# Patient Record
Sex: FEMALE | Race: BLACK OR AFRICAN AMERICAN | Employment: OTHER | URBAN - METROPOLITAN AREA
[De-identification: names, ages, dates, MRNs, and addresses within clinical notes are randomized per-mention and may not be internally consistent; named-entity substitution may affect disease eponyms.]

---

## 2017-01-06 DIAGNOSIS — E13.40 OTHER SPECIFIED DIABETES MELLITUS WITH DIABETIC NEUROPATHY: ICD-10-CM

## 2017-01-06 DIAGNOSIS — I10 ESSENTIAL HYPERTENSION: ICD-10-CM

## 2017-01-06 DIAGNOSIS — Z95.5 S/P PRIMARY ANGIOPLASTY WITH CORONARY STENT: ICD-10-CM

## 2017-01-10 RX ORDER — LISINOPRIL 5 MG/1
TABLET ORAL
Qty: 90 TABLET | Refills: 1 | Status: SHIPPED | OUTPATIENT
Start: 2017-01-10 | End: 2017-07-25 | Stop reason: SDUPTHER

## 2017-01-10 RX ORDER — FUROSEMIDE 40 MG/1
TABLET ORAL
Qty: 90 TABLET | Refills: 0 | Status: SHIPPED | OUTPATIENT
Start: 2017-01-10 | End: 2017-04-10 | Stop reason: SDUPTHER

## 2017-01-10 RX ORDER — METOPROLOL SUCCINATE 25 MG/1
TABLET, EXTENDED RELEASE ORAL
Qty: 90 TABLET | Refills: 1 | Status: SHIPPED | OUTPATIENT
Start: 2017-01-10 | End: 2017-07-25 | Stop reason: SDUPTHER

## 2017-01-10 RX ORDER — CLOPIDOGREL BISULFATE 75 MG/1
TABLET ORAL
Qty: 90 TABLET | Refills: 1 | Status: SHIPPED | OUTPATIENT
Start: 2017-01-10 | End: 2017-07-25 | Stop reason: SDUPTHER

## 2017-01-10 RX ORDER — GABAPENTIN 300 MG/1
CAPSULE ORAL
Qty: 270 CAPSULE | Refills: 0 | Status: SHIPPED | OUTPATIENT
Start: 2017-01-10 | End: 2017-04-10 | Stop reason: SDUPTHER

## 2017-04-04 ENCOUNTER — TELEPHONE (OUTPATIENT)
Dept: INTERNAL MEDICINE CLINIC | Age: 77
End: 2017-04-04

## 2017-04-06 ENCOUNTER — TELEPHONE (OUTPATIENT)
Dept: CARDIOLOGY CLINIC | Age: 77
End: 2017-04-06

## 2017-04-10 ENCOUNTER — OFFICE VISIT (OUTPATIENT)
Dept: INTERNAL MEDICINE CLINIC | Age: 77
End: 2017-04-10

## 2017-04-10 VITALS
OXYGEN SATURATION: 98 % | SYSTOLIC BLOOD PRESSURE: 130 MMHG | HEART RATE: 75 BPM | BODY MASS INDEX: 27.05 KG/M2 | DIASTOLIC BLOOD PRESSURE: 62 MMHG | HEIGHT: 62 IN | WEIGHT: 147 LBS

## 2017-04-10 DIAGNOSIS — Z95.5 S/P PRIMARY ANGIOPLASTY WITH CORONARY STENT: ICD-10-CM

## 2017-04-10 DIAGNOSIS — I10 ESSENTIAL HYPERTENSION: ICD-10-CM

## 2017-04-10 DIAGNOSIS — M25.552 HIP PAIN, BILATERAL: ICD-10-CM

## 2017-04-10 DIAGNOSIS — E78.00 PURE HYPERCHOLESTEROLEMIA: Primary | ICD-10-CM

## 2017-04-10 DIAGNOSIS — M25.551 HIP PAIN, BILATERAL: ICD-10-CM

## 2017-04-10 DIAGNOSIS — R60.0 PEDAL EDEMA: ICD-10-CM

## 2017-04-10 DIAGNOSIS — E11.40 CONTROLLED TYPE 2 DIABETES MELLITUS WITH DIABETIC NEUROPATHY, WITHOUT LONG-TERM CURRENT USE OF INSULIN (HCC): ICD-10-CM

## 2017-04-10 LAB — HBA1C MFR BLD: 6.1 %

## 2017-04-10 PROCEDURE — 99214 OFFICE O/P EST MOD 30 MIN: CPT | Performed by: INTERNAL MEDICINE

## 2017-04-10 PROCEDURE — 83036 HEMOGLOBIN GLYCOSYLATED A1C: CPT | Performed by: INTERNAL MEDICINE

## 2017-04-10 RX ORDER — GLIPIZIDE 10 MG/1
TABLET ORAL
Qty: 270 TABLET | Refills: 1 | Status: SHIPPED | OUTPATIENT
Start: 2017-04-10 | End: 2017-07-25 | Stop reason: SDUPTHER

## 2017-04-10 RX ORDER — TIZANIDINE 4 MG/1
2 TABLET ORAL 3 TIMES DAILY
Qty: 135 TABLET | Refills: 0 | Status: SHIPPED | OUTPATIENT
Start: 2017-04-10 | End: 2017-07-25 | Stop reason: SDUPTHER

## 2017-04-10 RX ORDER — FUROSEMIDE 40 MG/1
TABLET ORAL
Qty: 90 TABLET | Refills: 0 | Status: SHIPPED | OUTPATIENT
Start: 2017-04-10 | End: 2017-07-25 | Stop reason: SDUPTHER

## 2017-04-10 RX ORDER — HYDROCODONE BITARTRATE AND ACETAMINOPHEN 7.5; 325 MG/1; MG/1
TABLET ORAL
Qty: 180 TABLET | Refills: 0 | Status: SHIPPED | OUTPATIENT
Start: 2017-04-10 | End: 2017-07-25 | Stop reason: SDUPTHER

## 2017-04-10 RX ORDER — GABAPENTIN 300 MG/1
CAPSULE ORAL
Qty: 270 CAPSULE | Refills: 0 | Status: SHIPPED | OUTPATIENT
Start: 2017-04-10 | End: 2017-07-25 | Stop reason: SDUPTHER

## 2017-04-10 RX ORDER — ATORVASTATIN CALCIUM 40 MG/1
40 TABLET, FILM COATED ORAL DAILY
Qty: 30 TABLET | Refills: 5 | Status: SHIPPED | OUTPATIENT
Start: 2017-04-10 | End: 2017-04-21

## 2017-04-10 RX ORDER — SIMVASTATIN 20 MG
TABLET ORAL
Qty: 90 TABLET | Refills: 1 | Status: SHIPPED | OUTPATIENT
Start: 2017-04-10 | End: 2017-07-25 | Stop reason: SDUPTHER

## 2017-04-10 ASSESSMENT — ENCOUNTER SYMPTOMS
DIARRHEA: 1
BACK PAIN: 1
COUGH: 0
ABDOMINAL PAIN: 0
CHEST TIGHTNESS: 0
NAUSEA: 0
VOMITING: 0

## 2017-04-17 ENCOUNTER — NURSE ONLY (OUTPATIENT)
Dept: INTERNAL MEDICINE CLINIC | Age: 77
End: 2017-04-17

## 2017-04-17 DIAGNOSIS — I10 ESSENTIAL HYPERTENSION: ICD-10-CM

## 2017-04-17 DIAGNOSIS — E11.40 CONTROLLED TYPE 2 DIABETES MELLITUS WITH DIABETIC NEUROPATHY, WITHOUT LONG-TERM CURRENT USE OF INSULIN (HCC): ICD-10-CM

## 2017-04-17 DIAGNOSIS — E78.00 PURE HYPERCHOLESTEROLEMIA: ICD-10-CM

## 2017-04-17 DIAGNOSIS — M25.552 HIP PAIN, BILATERAL: ICD-10-CM

## 2017-04-17 DIAGNOSIS — M25.551 HIP PAIN, BILATERAL: ICD-10-CM

## 2017-04-17 LAB
A/G RATIO: 1.3 (ref 1.1–2.2)
ALBUMIN SERPL-MCNC: 3.8 G/DL (ref 3.4–5)
ALP BLD-CCNC: 93 U/L (ref 40–129)
ALT SERPL-CCNC: 12 U/L (ref 10–40)
ANION GAP SERPL CALCULATED.3IONS-SCNC: 13 MMOL/L (ref 3–16)
AST SERPL-CCNC: 15 U/L (ref 15–37)
BILIRUB SERPL-MCNC: 0.3 MG/DL (ref 0–1)
BUN BLDV-MCNC: 15 MG/DL (ref 7–20)
CALCIUM SERPL-MCNC: 10.4 MG/DL (ref 8.3–10.6)
CHLORIDE BLD-SCNC: 104 MMOL/L (ref 99–110)
CHOLESTEROL, TOTAL: 185 MG/DL (ref 0–199)
CO2: 24 MMOL/L (ref 21–32)
CREAT SERPL-MCNC: 0.8 MG/DL (ref 0.6–1.2)
GFR AFRICAN AMERICAN: >60
GFR NON-AFRICAN AMERICAN: >60
GLOBULIN: 3 G/DL
GLUCOSE BLD-MCNC: 148 MG/DL (ref 70–99)
HCT VFR BLD CALC: 30 % (ref 36–48)
HDLC SERPL-MCNC: 75 MG/DL (ref 40–60)
HEMOGLOBIN: 9.4 G/DL (ref 12–16)
LDL CHOLESTEROL CALCULATED: 86 MG/DL
MCH RBC QN AUTO: 27.8 PG (ref 26–34)
MCHC RBC AUTO-ENTMCNC: 31.2 G/DL (ref 31–36)
MCV RBC AUTO: 89.1 FL (ref 80–100)
PDW BLD-RTO: 14.4 % (ref 12.4–15.4)
PLATELET # BLD: 172 K/UL (ref 135–450)
PMV BLD AUTO: 9.2 FL (ref 5–10.5)
POTASSIUM SERPL-SCNC: 5.6 MMOL/L (ref 3.5–5.1)
RBC # BLD: 3.36 M/UL (ref 4–5.2)
SODIUM BLD-SCNC: 141 MMOL/L (ref 136–145)
TOTAL PROTEIN: 6.8 G/DL (ref 6.4–8.2)
TRIGL SERPL-MCNC: 122 MG/DL (ref 0–150)
VLDLC SERPL CALC-MCNC: 24 MG/DL
WBC # BLD: 4.5 K/UL (ref 4–11)

## 2017-04-17 PROCEDURE — 36415 COLL VENOUS BLD VENIPUNCTURE: CPT | Performed by: INTERNAL MEDICINE

## 2017-04-27 DIAGNOSIS — M54.16 LUMBAR RADICULOPATHY: ICD-10-CM

## 2017-04-27 DIAGNOSIS — E11.42 DIABETIC PERIPHERAL NEUROPATHY (HCC): ICD-10-CM

## 2017-04-27 DIAGNOSIS — G56.03 BILATERAL CARPAL TUNNEL SYNDROME: Primary | ICD-10-CM

## 2017-04-27 DIAGNOSIS — M25.551 HIP PAIN, BILATERAL: ICD-10-CM

## 2017-04-27 DIAGNOSIS — M25.552 HIP PAIN, BILATERAL: ICD-10-CM

## 2017-04-27 DIAGNOSIS — M17.0 OSTEOARTHRITIS OF BOTH KNEES, UNSPECIFIED OSTEOARTHRITIS TYPE: ICD-10-CM

## 2017-04-27 DIAGNOSIS — M11.20 CHONDROCALCINOSIS: ICD-10-CM

## 2017-04-28 ENCOUNTER — HOSPITAL ENCOUNTER (OUTPATIENT)
Dept: MAMMOGRAPHY | Age: 77
Discharge: HOME | End: 2017-04-28
Admitting: INTERNAL MEDICINE

## 2017-04-28 DIAGNOSIS — Z12.31 ENCOUNTER FOR SCREENING MAMMOGRAM FOR BREAST CANCER: ICD-10-CM

## 2017-05-17 ENCOUNTER — OFFICE VISIT (OUTPATIENT)
Dept: INTERNAL MEDICINE CLINIC | Age: 77
End: 2017-05-17

## 2017-05-17 VITALS
BODY MASS INDEX: 26.13 KG/M2 | HEIGHT: 62 IN | DIASTOLIC BLOOD PRESSURE: 62 MMHG | OXYGEN SATURATION: 98 % | HEART RATE: 72 BPM | SYSTOLIC BLOOD PRESSURE: 96 MMHG | WEIGHT: 142 LBS | RESPIRATION RATE: 16 BRPM

## 2017-05-17 DIAGNOSIS — E87.5 HYPERKALEMIA: ICD-10-CM

## 2017-05-17 DIAGNOSIS — L80 VITILIGO: ICD-10-CM

## 2017-05-17 DIAGNOSIS — E78.00 PURE HYPERCHOLESTEROLEMIA: Primary | ICD-10-CM

## 2017-05-17 DIAGNOSIS — D64.9 ANEMIA, UNSPECIFIED TYPE: ICD-10-CM

## 2017-05-17 PROCEDURE — 99213 OFFICE O/P EST LOW 20 MIN: CPT | Performed by: INTERNAL MEDICINE

## 2017-05-17 ASSESSMENT — ENCOUNTER SYMPTOMS
NAUSEA: 0
CHEST TIGHTNESS: 0
DIARRHEA: 1
VOMITING: 0
COUGH: 0

## 2017-05-17 ASSESSMENT — PATIENT HEALTH QUESTIONNAIRE - PHQ9
2. FEELING DOWN, DEPRESSED OR HOPELESS: 0
SUM OF ALL RESPONSES TO PHQ QUESTIONS 1-9: 0
1. LITTLE INTEREST OR PLEASURE IN DOING THINGS: 0
SUM OF ALL RESPONSES TO PHQ9 QUESTIONS 1 & 2: 0

## 2017-05-21 PROBLEM — E87.5 HYPERKALEMIA: Status: ACTIVE | Noted: 2017-05-21

## 2017-05-21 PROBLEM — D64.9 ANEMIA: Status: ACTIVE | Noted: 2017-05-21

## 2017-05-23 ENCOUNTER — TELEPHONE (OUTPATIENT)
Dept: INTERNAL MEDICINE CLINIC | Age: 77
End: 2017-05-23

## 2017-05-24 ENCOUNTER — HOSPITAL ENCOUNTER (OUTPATIENT)
Dept: OTHER | Age: 77
Discharge: HOME | End: 2017-05-31
Attending: INTERNAL MEDICINE | Admitting: INTERNAL MEDICINE

## 2017-06-29 ENCOUNTER — TELEPHONE (OUTPATIENT)
Dept: FAMILY MEDICINE CLINIC | Age: 77
End: 2017-06-29

## 2017-07-25 ENCOUNTER — OFFICE VISIT (OUTPATIENT)
Dept: FAMILY MEDICINE CLINIC | Age: 77
End: 2017-07-25

## 2017-07-25 VITALS
HEART RATE: 52 BPM | BODY MASS INDEX: 27.05 KG/M2 | WEIGHT: 147 LBS | SYSTOLIC BLOOD PRESSURE: 98 MMHG | HEIGHT: 62 IN | OXYGEN SATURATION: 98 % | RESPIRATION RATE: 16 BRPM | DIASTOLIC BLOOD PRESSURE: 62 MMHG

## 2017-07-25 DIAGNOSIS — K59.00 CONSTIPATION, UNSPECIFIED CONSTIPATION TYPE: ICD-10-CM

## 2017-07-25 DIAGNOSIS — M62.838 MUSCLE SPASM: ICD-10-CM

## 2017-07-25 DIAGNOSIS — D50.9 IRON DEFICIENCY ANEMIA, UNSPECIFIED IRON DEFICIENCY ANEMIA TYPE: ICD-10-CM

## 2017-07-25 DIAGNOSIS — E11.40 CONTROLLED TYPE 2 DIABETES MELLITUS WITH DIABETIC NEUROPATHY, WITHOUT LONG-TERM CURRENT USE OF INSULIN (HCC): Primary | ICD-10-CM

## 2017-07-25 DIAGNOSIS — Z95.5 S/P PRIMARY ANGIOPLASTY WITH CORONARY STENT: ICD-10-CM

## 2017-07-25 DIAGNOSIS — R60.0 PEDAL EDEMA: ICD-10-CM

## 2017-07-25 DIAGNOSIS — E78.00 PURE HYPERCHOLESTEROLEMIA: ICD-10-CM

## 2017-07-25 DIAGNOSIS — E87.5 HYPERKALEMIA: ICD-10-CM

## 2017-07-25 DIAGNOSIS — I10 ESSENTIAL HYPERTENSION: ICD-10-CM

## 2017-07-25 LAB — HBA1C MFR BLD: 6.7 %

## 2017-07-25 PROCEDURE — 83036 HEMOGLOBIN GLYCOSYLATED A1C: CPT | Performed by: INTERNAL MEDICINE

## 2017-07-25 PROCEDURE — 99214 OFFICE O/P EST MOD 30 MIN: CPT | Performed by: INTERNAL MEDICINE

## 2017-07-25 RX ORDER — FUROSEMIDE 40 MG/1
TABLET ORAL
Qty: 90 TABLET | Refills: 0 | Status: SHIPPED | OUTPATIENT
Start: 2017-07-25 | End: 2017-09-19 | Stop reason: SDUPTHER

## 2017-07-25 RX ORDER — GABAPENTIN 300 MG/1
CAPSULE ORAL
Qty: 270 CAPSULE | Refills: 0 | Status: SHIPPED | OUTPATIENT
Start: 2017-07-25 | End: 2017-09-19 | Stop reason: SDUPTHER

## 2017-07-25 RX ORDER — HYDROCODONE BITARTRATE AND ACETAMINOPHEN 7.5; 325 MG/1; MG/1
TABLET ORAL
Qty: 180 TABLET | Refills: 0 | Status: SHIPPED | OUTPATIENT
Start: 2017-07-25 | End: 2017-09-19 | Stop reason: SDUPTHER

## 2017-07-25 RX ORDER — GLIPIZIDE 10 MG/1
TABLET ORAL
Qty: 270 TABLET | Refills: 3 | Status: SHIPPED | OUTPATIENT
Start: 2017-07-25 | End: 2018-06-28 | Stop reason: SDUPTHER

## 2017-07-25 RX ORDER — TIZANIDINE 4 MG/1
2 TABLET ORAL 3 TIMES DAILY
Qty: 135 TABLET | Refills: 3 | Status: SHIPPED | OUTPATIENT
Start: 2017-07-25 | End: 2017-09-19 | Stop reason: SDUPTHER

## 2017-07-25 RX ORDER — CLOPIDOGREL BISULFATE 75 MG/1
75 TABLET ORAL DAILY
Qty: 90 TABLET | Refills: 1 | Status: SHIPPED | OUTPATIENT
Start: 2017-07-25 | End: 2018-01-31 | Stop reason: SDUPTHER

## 2017-07-25 RX ORDER — SIMVASTATIN 20 MG
TABLET ORAL
Qty: 90 TABLET | Refills: 1 | Status: SHIPPED | OUTPATIENT
Start: 2017-07-25 | End: 2018-05-24 | Stop reason: SDUPTHER

## 2017-07-25 RX ORDER — GLUCOSAMINE HCL/CHONDROITIN SU 500-400 MG
CAPSULE ORAL
Qty: 100 STRIP | Refills: 3 | Status: SHIPPED | OUTPATIENT
Start: 2017-07-25 | End: 2018-08-30 | Stop reason: SDUPTHER

## 2017-07-25 RX ORDER — LANCETS 30 GAUGE
EACH MISCELLANEOUS
Qty: 100 EACH | Refills: 3 | Status: SHIPPED | OUTPATIENT
Start: 2017-07-25 | End: 2018-08-30 | Stop reason: SDUPTHER

## 2017-07-25 RX ORDER — LISINOPRIL 5 MG/1
5 TABLET ORAL DAILY
Qty: 90 TABLET | Refills: 1 | Status: SHIPPED | OUTPATIENT
Start: 2017-07-25 | End: 2017-08-16 | Stop reason: ALTCHOICE

## 2017-07-25 RX ORDER — METOPROLOL SUCCINATE 25 MG/1
25 TABLET, EXTENDED RELEASE ORAL DAILY
Qty: 90 TABLET | Refills: 1 | Status: SHIPPED | OUTPATIENT
Start: 2017-07-25 | End: 2018-01-31 | Stop reason: SDUPTHER

## 2017-07-25 RX ORDER — POLYETHYLENE GLYCOL 3350 17 G/17G
17 POWDER, FOR SOLUTION ORAL DAILY
Qty: 3 BOTTLE | Refills: 3 | Status: SHIPPED | OUTPATIENT
Start: 2017-07-25 | End: 2018-06-28 | Stop reason: SDUPTHER

## 2017-07-25 RX ORDER — HYDROCODONE BITARTRATE AND ACETAMINOPHEN 7.5; 325 MG/1; MG/1
TABLET ORAL
Qty: 180 TABLET | Refills: 0 | Status: CANCELLED | OUTPATIENT
Start: 2017-07-25

## 2017-07-25 ASSESSMENT — ENCOUNTER SYMPTOMS
VOMITING: 0
NAUSEA: 0
CHEST TIGHTNESS: 0
DIARRHEA: 1
COUGH: 0

## 2017-07-28 ENCOUNTER — TELEPHONE (OUTPATIENT)
Dept: FAMILY MEDICINE CLINIC | Age: 77
End: 2017-07-28

## 2017-08-03 RX ORDER — BLOOD-GLUCOSE METER
EACH MISCELLANEOUS
Qty: 1 KIT | Refills: 1 | Status: SHIPPED | OUTPATIENT
Start: 2017-08-03

## 2017-08-07 ENCOUNTER — TELEPHONE (OUTPATIENT)
Dept: FAMILY MEDICINE CLINIC | Age: 77
End: 2017-08-07

## 2017-08-16 ENCOUNTER — OFFICE VISIT (OUTPATIENT)
Dept: CARDIOLOGY CLINIC | Age: 77
End: 2017-08-16

## 2017-08-16 VITALS
DIASTOLIC BLOOD PRESSURE: 64 MMHG | HEIGHT: 62 IN | BODY MASS INDEX: 27.23 KG/M2 | HEART RATE: 60 BPM | OXYGEN SATURATION: 98 % | SYSTOLIC BLOOD PRESSURE: 126 MMHG | WEIGHT: 148 LBS

## 2017-08-16 DIAGNOSIS — R00.2 PALPITATIONS: ICD-10-CM

## 2017-08-16 DIAGNOSIS — I25.10 CORONARY ARTERY DISEASE INVOLVING NATIVE CORONARY ARTERY OF NATIVE HEART WITHOUT ANGINA PECTORIS: Primary | ICD-10-CM

## 2017-08-16 DIAGNOSIS — E78.00 PURE HYPERCHOLESTEROLEMIA: ICD-10-CM

## 2017-08-16 DIAGNOSIS — I10 ESSENTIAL HYPERTENSION: ICD-10-CM

## 2017-08-16 PROCEDURE — 93000 ELECTROCARDIOGRAM COMPLETE: CPT | Performed by: INTERNAL MEDICINE

## 2017-08-16 PROCEDURE — 99214 OFFICE O/P EST MOD 30 MIN: CPT | Performed by: INTERNAL MEDICINE

## 2017-09-19 ENCOUNTER — OFFICE VISIT (OUTPATIENT)
Dept: FAMILY MEDICINE CLINIC | Age: 77
End: 2017-09-19

## 2017-09-19 VITALS
BODY MASS INDEX: 27.6 KG/M2 | OXYGEN SATURATION: 99 % | HEART RATE: 68 BPM | WEIGHT: 150 LBS | HEIGHT: 62 IN | SYSTOLIC BLOOD PRESSURE: 152 MMHG | DIASTOLIC BLOOD PRESSURE: 82 MMHG | RESPIRATION RATE: 16 BRPM

## 2017-09-19 DIAGNOSIS — E11.40 CONTROLLED TYPE 2 DIABETES MELLITUS WITH DIABETIC NEUROPATHY, WITHOUT LONG-TERM CURRENT USE OF INSULIN (HCC): Primary | ICD-10-CM

## 2017-09-19 DIAGNOSIS — M62.838 MUSCLE SPASM: ICD-10-CM

## 2017-09-19 DIAGNOSIS — D64.9 NORMOCHROMIC NORMOCYTIC ANEMIA: ICD-10-CM

## 2017-09-19 DIAGNOSIS — F51.02 ADJUSTMENT INSOMNIA: ICD-10-CM

## 2017-09-19 DIAGNOSIS — E78.2 MIXED HYPERLIPIDEMIA: ICD-10-CM

## 2017-09-19 DIAGNOSIS — R60.0 PEDAL EDEMA: ICD-10-CM

## 2017-09-19 DIAGNOSIS — Z95.5 S/P PRIMARY ANGIOPLASTY WITH CORONARY STENT: ICD-10-CM

## 2017-09-19 PROCEDURE — 99214 OFFICE O/P EST MOD 30 MIN: CPT | Performed by: INTERNAL MEDICINE

## 2017-09-19 RX ORDER — TIZANIDINE 4 MG/1
2 TABLET ORAL 3 TIMES DAILY
Qty: 135 TABLET | Refills: 3 | Status: SHIPPED | OUTPATIENT
Start: 2017-09-19 | End: 2018-06-28 | Stop reason: SDUPTHER

## 2017-09-19 RX ORDER — GABAPENTIN 300 MG/1
CAPSULE ORAL
Qty: 270 CAPSULE | Refills: 0 | Status: SHIPPED | OUTPATIENT
Start: 2017-09-19 | End: 2018-05-31 | Stop reason: SDUPTHER

## 2017-09-19 RX ORDER — HYDROCODONE BITARTRATE AND ACETAMINOPHEN 7.5; 325 MG/1; MG/1
TABLET ORAL
Qty: 180 TABLET | Refills: 0 | Status: SHIPPED | OUTPATIENT
Start: 2017-09-19 | End: 2018-05-22 | Stop reason: ALTCHOICE

## 2017-09-19 RX ORDER — FUROSEMIDE 40 MG/1
TABLET ORAL
Qty: 90 TABLET | Refills: 0 | Status: SHIPPED | OUTPATIENT
Start: 2017-09-19 | End: 2017-10-16 | Stop reason: SDUPTHER

## 2017-09-19 RX ORDER — AMITRIPTYLINE HYDROCHLORIDE 10 MG/1
10 TABLET, FILM COATED ORAL NIGHTLY
Qty: 90 TABLET | Refills: 0 | Status: SHIPPED | OUTPATIENT
Start: 2017-09-19 | End: 2017-10-26 | Stop reason: SDUPTHER

## 2017-09-19 ASSESSMENT — ENCOUNTER SYMPTOMS
NAUSEA: 0
VOMITING: 0
DIARRHEA: 1
CHEST TIGHTNESS: 0
COUGH: 0

## 2017-10-04 ENCOUNTER — TELEPHONE (OUTPATIENT)
Dept: FAMILY MEDICINE CLINIC | Age: 77
End: 2017-10-04

## 2017-10-13 DIAGNOSIS — E11.40 CONTROLLED TYPE 2 DIABETES MELLITUS WITH DIABETIC NEUROPATHY, WITHOUT LONG-TERM CURRENT USE OF INSULIN (HCC): ICD-10-CM

## 2017-10-13 DIAGNOSIS — E78.2 MIXED HYPERLIPIDEMIA: ICD-10-CM

## 2017-10-13 DIAGNOSIS — D64.9 NORMOCHROMIC NORMOCYTIC ANEMIA: ICD-10-CM

## 2017-10-13 LAB
A/G RATIO: 1.2 (ref 1.1–2.2)
ALBUMIN SERPL-MCNC: 3.6 G/DL (ref 3.4–5)
ALP BLD-CCNC: 91 U/L (ref 40–129)
ALT SERPL-CCNC: 15 U/L (ref 10–40)
ANION GAP SERPL CALCULATED.3IONS-SCNC: 9 MMOL/L (ref 3–16)
AST SERPL-CCNC: 15 U/L (ref 15–37)
BILIRUB SERPL-MCNC: 0.3 MG/DL (ref 0–1)
BUN BLDV-MCNC: 15 MG/DL (ref 7–20)
CALCIUM SERPL-MCNC: 9.4 MG/DL (ref 8.3–10.6)
CHLORIDE BLD-SCNC: 105 MMOL/L (ref 99–110)
CHOLESTEROL, TOTAL: 160 MG/DL (ref 0–199)
CO2: 27 MMOL/L (ref 21–32)
CREAT SERPL-MCNC: 0.8 MG/DL (ref 0.6–1.2)
GFR AFRICAN AMERICAN: >60
GFR NON-AFRICAN AMERICAN: >60
GLOBULIN: 3 G/DL
GLUCOSE BLD-MCNC: 175 MG/DL (ref 70–99)
HCT VFR BLD CALC: 30.8 % (ref 36–48)
HDLC SERPL-MCNC: 68 MG/DL (ref 40–60)
HEMOGLOBIN: 9.9 G/DL (ref 12–16)
LDL CHOLESTEROL CALCULATED: 68 MG/DL
MCH RBC QN AUTO: 29.5 PG (ref 26–34)
MCHC RBC AUTO-ENTMCNC: 32.2 G/DL (ref 31–36)
MCV RBC AUTO: 91.7 FL (ref 80–100)
PDW BLD-RTO: 13.5 % (ref 12.4–15.4)
PLATELET # BLD: 138 K/UL (ref 135–450)
PMV BLD AUTO: 9.9 FL (ref 5–10.5)
POTASSIUM SERPL-SCNC: 5.1 MMOL/L (ref 3.5–5.1)
RBC # BLD: 3.36 M/UL (ref 4–5.2)
SODIUM BLD-SCNC: 141 MMOL/L (ref 136–145)
TOTAL PROTEIN: 6.6 G/DL (ref 6.4–8.2)
TRIGL SERPL-MCNC: 118 MG/DL (ref 0–150)
VLDLC SERPL CALC-MCNC: 24 MG/DL
WBC # BLD: 4.2 K/UL (ref 4–11)

## 2017-10-26 DIAGNOSIS — F51.02 ADJUSTMENT INSOMNIA: ICD-10-CM

## 2017-10-26 RX ORDER — AMITRIPTYLINE HYDROCHLORIDE 10 MG/1
10 TABLET, FILM COATED ORAL NIGHTLY
Qty: 90 TABLET | Refills: 0 | Status: SHIPPED | OUTPATIENT
Start: 2017-10-26 | End: 2018-01-13 | Stop reason: SDUPTHER

## 2018-01-13 DIAGNOSIS — F51.02 ADJUSTMENT INSOMNIA: ICD-10-CM

## 2018-01-15 RX ORDER — AMITRIPTYLINE HYDROCHLORIDE 10 MG/1
TABLET, FILM COATED ORAL
Qty: 90 TABLET | Refills: 0 | Status: SHIPPED | OUTPATIENT
Start: 2018-01-15 | End: 2021-03-02 | Stop reason: ALTCHOICE

## 2018-01-30 ENCOUNTER — TELEPHONE (OUTPATIENT)
Dept: CARDIOLOGY CLINIC | Age: 78
End: 2018-01-30

## 2018-01-30 NOTE — TELEPHONE ENCOUNTER
Called patient. She is currently in [place]. I told her that she should not take any ibuprofen since she is already taking aspirin and plavix. Patient states that the physician who has been prescribing her pain medication will not fill them since she missed her appointment. I told her that she should be seen by someone in [place]. We do not prescribe pain medication. We can not diagnose her over the phone. Patient verbalized and confirmed understanding.

## 2018-01-30 NOTE — TELEPHONE ENCOUNTER
Patient states she fell and hurt her leg, pt would like to know if she can take 800 mg of ibuprofen. Patient is currently on plavix and aspirin. You can reach the pt at [phone].

## 2018-02-12 ENCOUNTER — TELEPHONE (OUTPATIENT)
Dept: CARDIOLOGY CLINIC | Age: 78
End: 2018-02-12

## 2018-02-12 NOTE — TELEPHONE ENCOUNTER
Called patient. She said that she will be staying in [place] until November then back in [place]. Requested that she sign a release form for medical records. Also called Cardiology in [facility],  [phone]. Patient did not sign a release form or have an appointment scheduled yet. Office will contact patient since they can not find anything in their system as having an appointment with Cardiology. Will send to O once we have a signed release form. Both patient and office verbalized and confirmed understanding.

## 2018-02-19 ENCOUNTER — TELEPHONE (OUTPATIENT)
Dept: FAMILY MEDICINE CLINIC | Age: 78
End: 2018-02-19

## 2018-02-19 NOTE — TELEPHONE ENCOUNTER
Patient want her [address] paperwork faxed again to [place]. She said their office did not get paperwork. There is no fax number on paperwork. She will call [facility] and get fax number and call us back.

## 2018-02-19 NOTE — TELEPHONE ENCOUNTER
She needs us to send the Parking [employer]acard letter that we made on 9/14/17 to [facility]. We are now just waiting on a fax number from the pt.

## 2018-03-28 DIAGNOSIS — E11.40 CONTROLLED TYPE 2 DIABETES MELLITUS WITH DIABETIC NEUROPATHY, WITHOUT LONG-TERM CURRENT USE OF INSULIN (HCC): ICD-10-CM

## 2018-05-22 ENCOUNTER — OFFICE VISIT (OUTPATIENT)
Dept: FAMILY MEDICINE CLINIC | Age: 78
End: 2018-05-22

## 2018-05-22 VITALS
RESPIRATION RATE: 16 BRPM | SYSTOLIC BLOOD PRESSURE: 130 MMHG | DIASTOLIC BLOOD PRESSURE: 72 MMHG | OXYGEN SATURATION: 96 % | TEMPERATURE: 97.5 F | WEIGHT: 155.4 LBS | HEART RATE: 59 BPM | HEIGHT: 62 IN | BODY MASS INDEX: 28.6 KG/M2

## 2018-05-22 DIAGNOSIS — I10 ESSENTIAL HYPERTENSION: ICD-10-CM

## 2018-05-22 DIAGNOSIS — R06.02 SOBOE (SHORTNESS OF BREATH ON EXERTION): ICD-10-CM

## 2018-05-22 DIAGNOSIS — E11.9 TYPE 2 DIABETES MELLITUS WITHOUT COMPLICATION, WITHOUT LONG-TERM CURRENT USE OF INSULIN (HCC): Primary | ICD-10-CM

## 2018-05-22 DIAGNOSIS — M54.16 LUMBAR RADICULOPATHY: ICD-10-CM

## 2018-05-22 DIAGNOSIS — E11.9 TYPE 2 DIABETES MELLITUS WITHOUT COMPLICATION, WITHOUT LONG-TERM CURRENT USE OF INSULIN (HCC): ICD-10-CM

## 2018-05-22 DIAGNOSIS — R60.0 LOWER LEG EDEMA: ICD-10-CM

## 2018-05-22 DIAGNOSIS — E13.40 OTHER SPECIFIED DIABETES MELLITUS WITH DIABETIC NEUROPATHY: ICD-10-CM

## 2018-05-22 LAB
A/G RATIO: 1.3 (ref 1.1–2.2)
ALBUMIN SERPL-MCNC: 3.9 G/DL (ref 3.4–5)
ALP BLD-CCNC: 66 U/L (ref 40–129)
ALT SERPL-CCNC: 14 U/L (ref 10–40)
ANION GAP SERPL CALCULATED.3IONS-SCNC: 12 MMOL/L (ref 3–16)
AST SERPL-CCNC: 15 U/L (ref 15–37)
BASOPHILS ABSOLUTE: 0 K/UL (ref 0–0.2)
BASOPHILS RELATIVE PERCENT: 0.5 %
BILIRUB SERPL-MCNC: 0.4 MG/DL (ref 0–1)
BUN BLDV-MCNC: 18 MG/DL (ref 7–20)
CALCIUM SERPL-MCNC: 9.6 MG/DL (ref 8.3–10.6)
CHLORIDE BLD-SCNC: 106 MMOL/L (ref 99–110)
CHOLESTEROL, TOTAL: 155 MG/DL (ref 0–199)
CO2: 22 MMOL/L (ref 21–32)
CREAT SERPL-MCNC: 0.8 MG/DL (ref 0.6–1.2)
CREATININE URINE POCT: NORMAL
EOSINOPHILS ABSOLUTE: 0.1 K/UL (ref 0–0.6)
EOSINOPHILS RELATIVE PERCENT: 3.6 %
FOLATE: >20 NG/ML (ref 4.78–24.2)
GFR AFRICAN AMERICAN: >60
GFR NON-AFRICAN AMERICAN: >60
GLOBULIN: 2.9 G/DL
GLUCOSE BLD-MCNC: 72 MG/DL (ref 70–99)
HBA1C MFR BLD: 7 %
HCT VFR BLD CALC: 31.4 % (ref 36–48)
HDLC SERPL-MCNC: 59 MG/DL (ref 40–60)
HEMOGLOBIN: 10 G/DL (ref 12–16)
LDL CHOLESTEROL CALCULATED: 71 MG/DL
LYMPHOCYTES ABSOLUTE: 1.1 K/UL (ref 1–5.1)
LYMPHOCYTES RELATIVE PERCENT: 28.4 %
MCH RBC QN AUTO: 29.5 PG (ref 26–34)
MCHC RBC AUTO-ENTMCNC: 32 G/DL (ref 31–36)
MCV RBC AUTO: 92.3 FL (ref 80–100)
MICROALBUMIN/CREAT 24H UR: NORMAL MG/G{CREAT}
MICROALBUMIN/CREAT UR-RTO: NORMAL
MONOCYTES ABSOLUTE: 0.4 K/UL (ref 0–1.3)
MONOCYTES RELATIVE PERCENT: 10.4 %
NEUTROPHILS ABSOLUTE: 2.2 K/UL (ref 1.7–7.7)
NEUTROPHILS RELATIVE PERCENT: 57.1 %
PDW BLD-RTO: 14.3 % (ref 12.4–15.4)
PLATELET # BLD: 159 K/UL (ref 135–450)
PMV BLD AUTO: 9.2 FL (ref 5–10.5)
POTASSIUM SERPL-SCNC: 5.4 MMOL/L (ref 3.5–5.1)
RBC # BLD: 3.4 M/UL (ref 4–5.2)
SODIUM BLD-SCNC: 140 MMOL/L (ref 136–145)
TOTAL PROTEIN: 6.8 G/DL (ref 6.4–8.2)
TRIGL SERPL-MCNC: 125 MG/DL (ref 0–150)
TSH SERPL DL<=0.05 MIU/L-ACNC: 0.79 UIU/ML (ref 0.27–4.2)
VITAMIN B-12: 198 PG/ML (ref 211–911)
VLDLC SERPL CALC-MCNC: 25 MG/DL
WBC # BLD: 3.9 K/UL (ref 4–11)

## 2018-05-22 PROCEDURE — 82044 UR ALBUMIN SEMIQUANTITATIVE: CPT | Performed by: NURSE PRACTITIONER

## 2018-05-22 PROCEDURE — G8417 CALC BMI ABV UP PARAM F/U: HCPCS | Performed by: NURSE PRACTITIONER

## 2018-05-22 PROCEDURE — 4040F PNEUMOC VAC/ADMIN/RCVD: CPT | Performed by: NURSE PRACTITIONER

## 2018-05-22 PROCEDURE — 1090F PRES/ABSN URINE INCON ASSESS: CPT | Performed by: NURSE PRACTITIONER

## 2018-05-22 PROCEDURE — G8427 DOCREV CUR MEDS BY ELIG CLIN: HCPCS | Performed by: NURSE PRACTITIONER

## 2018-05-22 PROCEDURE — G8598 ASA/ANTIPLAT THER USED: HCPCS | Performed by: NURSE PRACTITIONER

## 2018-05-22 PROCEDURE — 1036F TOBACCO NON-USER: CPT | Performed by: NURSE PRACTITIONER

## 2018-05-22 PROCEDURE — G8400 PT W/DXA NO RESULTS DOC: HCPCS | Performed by: NURSE PRACTITIONER

## 2018-05-22 PROCEDURE — 99214 OFFICE O/P EST MOD 30 MIN: CPT | Performed by: NURSE PRACTITIONER

## 2018-05-22 PROCEDURE — 1123F ACP DISCUSS/DSCN MKR DOCD: CPT | Performed by: NURSE PRACTITIONER

## 2018-05-22 PROCEDURE — 83036 HEMOGLOBIN GLYCOSYLATED A1C: CPT | Performed by: NURSE PRACTITIONER

## 2018-05-22 RX ORDER — BLOOD-GLUCOSE METER
KIT MISCELLANEOUS
Qty: 1 KIT | Refills: 0 | Status: SHIPPED | OUTPATIENT
Start: 2018-05-22 | End: 2021-03-09 | Stop reason: SDUPTHER

## 2018-05-22 ASSESSMENT — PATIENT HEALTH QUESTIONNAIRE - PHQ9
1. LITTLE INTEREST OR PLEASURE IN DOING THINGS: 0
SUM OF ALL RESPONSES TO PHQ QUESTIONS 1-9: 0
SUM OF ALL RESPONSES TO PHQ9 QUESTIONS 1 & 2: 0
2. FEELING DOWN, DEPRESSED OR HOPELESS: 0

## 2018-05-22 ASSESSMENT — ENCOUNTER SYMPTOMS
CHEST TIGHTNESS: 1
WHEEZING: 0
ALLERGIC/IMMUNOLOGIC NEGATIVE: 1
BACK PAIN: 1
SHORTNESS OF BREATH: 0
GASTROINTESTINAL NEGATIVE: 1
EYES NEGATIVE: 1

## 2018-05-24 DIAGNOSIS — R60.0 PEDAL EDEMA: ICD-10-CM

## 2018-05-24 DIAGNOSIS — Z95.5 S/P PRIMARY ANGIOPLASTY WITH CORONARY STENT: ICD-10-CM

## 2018-05-24 RX ORDER — LISINOPRIL 5 MG/1
TABLET ORAL
Qty: 30 TABLET | Refills: 0 | Status: SHIPPED | OUTPATIENT
Start: 2018-05-24 | End: 2018-05-31 | Stop reason: SDUPTHER

## 2018-05-24 RX ORDER — CLOPIDOGREL BISULFATE 75 MG/1
TABLET ORAL
Qty: 30 TABLET | Refills: 0 | Status: SHIPPED | OUTPATIENT
Start: 2018-05-24 | End: 2018-05-31 | Stop reason: SDUPTHER

## 2018-05-24 RX ORDER — FUROSEMIDE 40 MG/1
TABLET ORAL
Qty: 30 TABLET | Refills: 0 | Status: SHIPPED | OUTPATIENT
Start: 2018-05-24 | End: 2018-05-31 | Stop reason: SDUPTHER

## 2018-05-29 DIAGNOSIS — E11.40 CONTROLLED TYPE 2 DIABETES MELLITUS WITH DIABETIC NEUROPATHY, WITHOUT LONG-TERM CURRENT USE OF INSULIN (HCC): ICD-10-CM

## 2018-05-29 RX ORDER — GABAPENTIN 300 MG/1
CAPSULE ORAL
Qty: 270 CAPSULE | Refills: 0 | OUTPATIENT
Start: 2018-05-29

## 2018-05-31 ENCOUNTER — OFFICE VISIT (OUTPATIENT)
Dept: FAMILY MEDICINE CLINIC | Age: 78
End: 2018-05-31

## 2018-05-31 VITALS
SYSTOLIC BLOOD PRESSURE: 124 MMHG | OXYGEN SATURATION: 99 % | BODY MASS INDEX: 28.16 KG/M2 | HEIGHT: 62 IN | DIASTOLIC BLOOD PRESSURE: 74 MMHG | HEART RATE: 64 BPM | WEIGHT: 153 LBS

## 2018-05-31 DIAGNOSIS — E11.40 CONTROLLED TYPE 2 DIABETES MELLITUS WITH DIABETIC NEUROPATHY, WITHOUT LONG-TERM CURRENT USE OF INSULIN (HCC): ICD-10-CM

## 2018-05-31 DIAGNOSIS — I10 ESSENTIAL HYPERTENSION: ICD-10-CM

## 2018-05-31 PROCEDURE — 1090F PRES/ABSN URINE INCON ASSESS: CPT | Performed by: INTERNAL MEDICINE

## 2018-05-31 PROCEDURE — G8427 DOCREV CUR MEDS BY ELIG CLIN: HCPCS | Performed by: INTERNAL MEDICINE

## 2018-05-31 PROCEDURE — 99214 OFFICE O/P EST MOD 30 MIN: CPT | Performed by: INTERNAL MEDICINE

## 2018-05-31 PROCEDURE — G8417 CALC BMI ABV UP PARAM F/U: HCPCS | Performed by: INTERNAL MEDICINE

## 2018-05-31 PROCEDURE — G8400 PT W/DXA NO RESULTS DOC: HCPCS | Performed by: INTERNAL MEDICINE

## 2018-05-31 PROCEDURE — G8598 ASA/ANTIPLAT THER USED: HCPCS | Performed by: INTERNAL MEDICINE

## 2018-05-31 PROCEDURE — 1036F TOBACCO NON-USER: CPT | Performed by: INTERNAL MEDICINE

## 2018-05-31 PROCEDURE — 4040F PNEUMOC VAC/ADMIN/RCVD: CPT | Performed by: INTERNAL MEDICINE

## 2018-05-31 PROCEDURE — 1123F ACP DISCUSS/DSCN MKR DOCD: CPT | Performed by: INTERNAL MEDICINE

## 2018-05-31 RX ORDER — METOPROLOL SUCCINATE 25 MG/1
25 TABLET, EXTENDED RELEASE ORAL DAILY
Qty: 90 TABLET | Refills: 1 | Status: SHIPPED | OUTPATIENT
Start: 2018-05-31 | End: 2018-08-30 | Stop reason: SDUPTHER

## 2018-05-31 RX ORDER — HYDROCODONE BITARTRATE AND ACETAMINOPHEN 7.5; 325 MG/1; MG/1
TABLET ORAL
Qty: 180 TABLET | Refills: 0 | Status: SHIPPED | OUTPATIENT
Start: 2018-05-31 | End: 2018-05-31

## 2018-05-31 RX ORDER — GABAPENTIN 300 MG/1
CAPSULE ORAL
Qty: 270 CAPSULE | Refills: 0 | Status: SHIPPED | OUTPATIENT
Start: 2018-05-31 | End: 2018-08-30 | Stop reason: SDUPTHER

## 2018-05-31 ASSESSMENT — ENCOUNTER SYMPTOMS
NAUSEA: 0
COUGH: 0
DIARRHEA: 1
CHEST TIGHTNESS: 0
VOMITING: 0

## 2018-06-04 ENCOUNTER — TELEPHONE (OUTPATIENT)
Dept: FAMILY MEDICINE CLINIC | Age: 78
End: 2018-06-04

## 2018-06-28 DIAGNOSIS — E11.40 CONTROLLED TYPE 2 DIABETES MELLITUS WITH DIABETIC NEUROPATHY, WITHOUT LONG-TERM CURRENT USE OF INSULIN (HCC): ICD-10-CM

## 2018-06-28 DIAGNOSIS — M62.838 MUSCLE SPASM: ICD-10-CM

## 2018-06-28 DIAGNOSIS — K59.00 CONSTIPATION, UNSPECIFIED CONSTIPATION TYPE: ICD-10-CM

## 2018-06-29 RX ORDER — GLIPIZIDE 10 MG/1
TABLET ORAL
Qty: 270 TABLET | Refills: 3 | Status: SHIPPED | OUTPATIENT
Start: 2018-06-29 | End: 2018-08-30 | Stop reason: SDUPTHER

## 2018-06-29 RX ORDER — TIZANIDINE 4 MG/1
TABLET ORAL
Qty: 135 TABLET | Refills: 3 | Status: SHIPPED | OUTPATIENT
Start: 2018-06-29 | End: 2021-03-02 | Stop reason: ALTCHOICE

## 2018-06-29 RX ORDER — POLYETHYLENE GLYCOL 3350 17 G/17G
POWDER, FOR SOLUTION ORAL
Qty: 1581 G | Refills: 3 | Status: SHIPPED | OUTPATIENT
Start: 2018-06-29 | End: 2018-08-30

## 2018-07-11 ENCOUNTER — OFFICE VISIT (OUTPATIENT)
Dept: CARDIOLOGY CLINIC | Age: 78
End: 2018-07-11

## 2018-07-11 VITALS
BODY MASS INDEX: 28.51 KG/M2 | OXYGEN SATURATION: 99 % | HEART RATE: 58 BPM | WEIGHT: 151 LBS | HEIGHT: 61 IN | DIASTOLIC BLOOD PRESSURE: 66 MMHG | SYSTOLIC BLOOD PRESSURE: 142 MMHG

## 2018-07-11 DIAGNOSIS — I25.10 CORONARY ARTERY DISEASE INVOLVING NATIVE CORONARY ARTERY OF NATIVE HEART WITHOUT ANGINA PECTORIS: ICD-10-CM

## 2018-07-11 DIAGNOSIS — R07.9 CHEST PAIN, UNSPECIFIED TYPE: Primary | ICD-10-CM

## 2018-07-11 DIAGNOSIS — R06.02 SOBOE (SHORTNESS OF BREATH ON EXERTION): ICD-10-CM

## 2018-07-11 DIAGNOSIS — I10 ESSENTIAL HYPERTENSION: ICD-10-CM

## 2018-07-11 DIAGNOSIS — E78.5 HYPERLIPIDEMIA LDL GOAL <70: ICD-10-CM

## 2018-07-11 PROCEDURE — 1101F PT FALLS ASSESS-DOCD LE1/YR: CPT | Performed by: NURSE PRACTITIONER

## 2018-07-11 PROCEDURE — 1090F PRES/ABSN URINE INCON ASSESS: CPT | Performed by: NURSE PRACTITIONER

## 2018-07-11 PROCEDURE — G8400 PT W/DXA NO RESULTS DOC: HCPCS | Performed by: NURSE PRACTITIONER

## 2018-07-11 PROCEDURE — 4040F PNEUMOC VAC/ADMIN/RCVD: CPT | Performed by: NURSE PRACTITIONER

## 2018-07-11 PROCEDURE — G8598 ASA/ANTIPLAT THER USED: HCPCS | Performed by: NURSE PRACTITIONER

## 2018-07-11 PROCEDURE — 1036F TOBACCO NON-USER: CPT | Performed by: NURSE PRACTITIONER

## 2018-07-11 PROCEDURE — 93000 ELECTROCARDIOGRAM COMPLETE: CPT | Performed by: NURSE PRACTITIONER

## 2018-07-11 PROCEDURE — 1123F ACP DISCUSS/DSCN MKR DOCD: CPT | Performed by: NURSE PRACTITIONER

## 2018-07-11 PROCEDURE — G8417 CALC BMI ABV UP PARAM F/U: HCPCS | Performed by: NURSE PRACTITIONER

## 2018-07-11 PROCEDURE — G8427 DOCREV CUR MEDS BY ELIG CLIN: HCPCS | Performed by: NURSE PRACTITIONER

## 2018-07-11 PROCEDURE — 99214 OFFICE O/P EST MOD 30 MIN: CPT | Performed by: NURSE PRACTITIONER

## 2018-07-11 RX ORDER — ISOSORBIDE MONONITRATE 30 MG/1
30 TABLET, EXTENDED RELEASE ORAL DAILY
Qty: 30 TABLET | Refills: 3 | Status: SHIPPED | OUTPATIENT
Start: 2018-07-11 | End: 2018-08-30 | Stop reason: SDUPTHER

## 2018-07-11 NOTE — PATIENT INSTRUCTIONS
Continue same medications    Add Isosorbide Mononitrate 30 mg tablet daily    Scheduled stress test at [facility]

## 2018-07-11 NOTE — LETTER
[facility] [address] [place]. [address]  Phone: [phone]  Fax: [phone]    Adan Winston, APRN - CNP        July 11, 2018     Isael Christine MD  [address]    Patient: Justine Bautista  MR Number: [id number]  YOB: 1940  Date of Visit: 7/11/2018    Dear Dr. Isael Christine:    Thank you for the request for consultation for [facility]. HPI:  The patient is 77 y.o. female with a past medical history significant for HTN, hyperlipidemia, diabetes mellitus, CAD/stent to LAD, OA and breast CA usually followed by Dr. Julia Mclean who is here for routine follow up of CAD, HTN and HLP. She was last seen in 8/2017 and no changes at that time. Overall doing okay. Has noted some palpitations at night when she lies down. At times associated with wheezing. She does not have any wheezing during the day. Uses cane for ambulation. Has noted increasing SOB with exertion. Has infrequent episodes of chest pain (sharp) in center of her chest, occurred with walking/activity and resolves with rest. No NTG. Denies orthopnea/PND, cough, dizziness, syncope, edema , weight change or claudication. No regular exercise. Assessment:    1. Chest pain, unspecified type  -suggestive of angina  -will ask for stress test to evaluate for ischemia    2. Coronary artery disease involving native coronary artery of native heart without angina pectoris  -S/P VOLODYMYR x 2 to LAD in 2015  -now with recurrent chest pain and SOBOE (? Anginal equivalent)  -no ischemic changes on ECG today  -will add nitrate  -continue ASA, plavix, BB and statin    3. SOBOE (shortness of breath on exertion)  -features suggest anginal equivalent  -with some worsening    4. Essential hypertension  -fairly well controlled  -continue medical management    5.  Hyperlipidemia LDL goal <70  -on statin    Plan:    Lexiscan-Myoview to R/O ischemia given known CAD

## 2018-07-11 NOTE — PROGRESS NOTES
Roderick Moralez [address]  1940    July 11, 2018    CC:   Chief Complaint   Patient presents with    Hypertension     hld,dm,cad/ f/u/pt states that she feels okay but she has been wheezing more so at night/ no cardiac complaints at this time. Dimple Rodriguez / Sanjuana Koehler pt if she had her medication list with her she said yes but she left her purse out in the waiting room , she states all her medications are the same at this time. HPI:  The patient is 77 y.o. female with a past medical history significant for HTN, hyperlipidemia, diabetes mellitus, CAD/stent to LAD, OA and breast CA usually followed by Dr. Asmita Melendrez who is here for routine follow up of CAD, HTN and HLP. She was last seen in 8/2017 and no changes at that time. Overall doing okay. Has noted some palpitations at night when she lies down. At times associated with wheezing. She does not have any wheezing during the day. Uses cane for ambulation. Has noted increasing SOB with exertion. Has infrequent episodes of chest pain (sharp) in center of her chest, occurred with walking/activity and resolves with rest. No NTG. Denies orthopnea/PND, cough, dizziness, syncope, edema , weight change or claudication. No regular exercise. Review of Systems:  Constitutional: Denies weakness, night sweats or fever. + chronic fatigue  HEENT: Denies new visual changes, ringing in ears, nosebleeds, nasal congestion  Respiratory: + SOBOE and occasional cough  Cardiovascular: see HPI  GI: Denies N/V, diarrhea, constipation, abdominal pain, change in bowel habits, melena or hematochezia  : Denies urinary frequency, urgency, incontinence, hematuria or dysuria. Skin: Denies rash, hives, or cyanosis  Musculoskeletal: + chronic arthritic pain  Neurological: Denies syncope or TIA-like symptoms.   Psychiatric: Denies anxiety, insomnia or depression     Past Medical History:   Diagnosis Date    Ankle swelling     Arthritis     Breast cancer  furosemide (LASIX) 40 MG tablet TAKE 1 TABLET EVERY DAY 90 tablet 2    Blood Glucose Monitoring Suppl (BLOOD GLUCOSE SYSTEM NOEL) KIT Use as directed to test blood sugar three times a day  E11.9 1 kit 0    metFORMIN (GLUCOPHAGE) 1000 MG tablet TAKE 1 TABLET TWICE DAILY WITH MEALS  FOR  SUGAR 180 tablet 2    amitriptyline (ELAVIL) 10 MG tablet TAKE 1 TABLET EVERY NIGHT AT BEDTIME 90 tablet 0    Blood Glucose Monitoring Suppl (CONTOUR NEXT ONE) KIT Test blood sugar twice a day 1 kit 1    glucose blood VI test strips (ASCENSIA AUTODISC VI;ONE TOUCH ULTRA TEST VI) strip 1 each by In Vitro route 2 times daily As needed. 100 each 5    Glucose Blood (BLOOD GLUCOSE TEST STRIPS) STRP Use as directed to test blood sugar three times a day E11.9 100 strip 3    Lancets MISC Use as directed to test blood sugar three times a day E11.9 100 each 3    aspirin 81 MG tablet Take 1 tablet by mouth daily 30 tablet 3     No current facility-administered medications for this visit. Physical Exam:   BP (!) 142/66 (Site: Right Arm, Position: Sitting, Cuff Size: Small Adult)   Pulse 58   Ht 5' 1\" (1.549 m)   Wt 151 lb (68.5 kg)   SpO2 99%   BMI 28.53 kg/m²   Wt Readings from Last 2 Encounters:   07/11/18 151 lb (68.5 kg)   05/31/18 153 lb (69.4 kg)     Constitutional: She is oriented to person, place, and time. She appears well-developed and well-nourished. In no acute distress. HEENT: Normocephalic and atraumatic. Sclerae anicteric. No xanthelasmas. EOM's intact. Neck: Neck supple. No JVD present. Carotids without bruits. No thyromegaly present. No lymphadenopathy present. Cardiovascular: RRR, normal S1 and S2; no murmur/gallop or rub  Pulmonary/Chest: Effort normal.  Lungs clear to auscultation. Chest wall nontender  Abdominal: soft, nontender, nondistended. + bowel sounds; no hepatomegaly   Extremities: No edema or cyanosis. Pulses are 2+ radial/DP bilaterally. Cap refill brisk. Neurological: No focal deficit.

## 2018-07-20 ENCOUNTER — HOSPITAL ENCOUNTER (OUTPATIENT)
Dept: NUCLEAR MEDICINE | Age: 78
Discharge: HOME | End: 2018-07-20
Payer: MEDICARE

## 2018-07-20 ENCOUNTER — HOSPITAL ENCOUNTER (OUTPATIENT)
Dept: NON INVASIVE DIAGNOSTICS | Age: 78
Discharge: HOME | End: 2018-07-20
Payer: MEDICARE

## 2018-07-20 DIAGNOSIS — I25.810 CORONARY ARTERY DISEASE INVOLVING CORONARY BYPASS GRAFT OF NATIVE HEART WITHOUT ANGINA PECTORIS: ICD-10-CM

## 2018-07-20 DIAGNOSIS — I25.10 CORONARY ARTERY DISEASE INVOLVING NATIVE CORONARY ARTERY OF NATIVE HEART WITHOUT ANGINA PECTORIS: ICD-10-CM

## 2018-07-20 DIAGNOSIS — R07.9 CHEST PAIN, UNSPECIFIED TYPE: ICD-10-CM

## 2018-07-20 LAB
LV EF: 81 %
LVEF MODALITY: NORMAL

## 2018-07-20 PROCEDURE — A9502 TC99M TETROFOSMIN: HCPCS | Performed by: NURSE PRACTITIONER

## 2018-07-20 PROCEDURE — 93017 CV STRESS TEST TRACING ONLY: CPT

## 2018-07-20 PROCEDURE — 6360000002 HC RX W HCPCS: Performed by: NURSE PRACTITIONER

## 2018-07-20 PROCEDURE — 2580000003 HC RX 258: Performed by: NURSE PRACTITIONER

## 2018-07-20 PROCEDURE — 78452 HT MUSCLE IMAGE SPECT MULT: CPT

## 2018-07-20 PROCEDURE — 3430000000 HC RX DIAGNOSTIC RADIOPHARMACEUTICAL: Performed by: NURSE PRACTITIONER

## 2018-07-20 RX ORDER — 0.9 % SODIUM CHLORIDE 0.9 %
10 VIAL (ML) INJECTION PRN
Status: DISCONTINUED | OUTPATIENT
Start: 2018-07-20 | End: 2018-07-21 | Stop reason: HOSPADM

## 2018-07-20 RX ADMIN — TETROFOSMIN 10 MILLICURIE: 1.38 INJECTION, POWDER, LYOPHILIZED, FOR SOLUTION INTRAVENOUS at 12:28

## 2018-07-20 RX ADMIN — SODIUM CHLORIDE 10 ML: 9 INJECTION, SOLUTION INTRAMUSCULAR; INTRAVENOUS; SUBCUTANEOUS at 13:34

## 2018-07-20 RX ADMIN — REGADENOSON 0.4 MG: 0.08 INJECTION, SOLUTION INTRAVENOUS at 13:34

## 2018-07-20 RX ADMIN — SODIUM CHLORIDE 10 ML: 9 INJECTION, SOLUTION INTRAMUSCULAR; INTRAVENOUS; SUBCUTANEOUS at 12:28

## 2018-07-20 RX ADMIN — TETROFOSMIN 30 MILLICURIE: 1.38 INJECTION, POWDER, LYOPHILIZED, FOR SOLUTION INTRAVENOUS at 13:34

## 2018-08-07 NOTE — TELEPHONE ENCOUNTER
Last ov 05/31/2018   Future Appointments  Date Time Provider Deepak Whitney   8/8/2018 1:00 PM Oli Estrada [address] [id number] [facility]   8/30/2018 11:00 AM Ambrocio Keys MD [facility]   9/18/2018 2:45 PM Amado Rivera MD [facility]

## 2018-08-08 ENCOUNTER — HOSPITAL ENCOUNTER (OUTPATIENT)
Dept: MAMMOGRAPHY | Age: 78
Discharge: HOME | End: 2018-08-08
Payer: MEDICARE

## 2018-08-08 DIAGNOSIS — Z12.31 ENCOUNTER FOR SCREENING MAMMOGRAM FOR BREAST CANCER: ICD-10-CM

## 2018-08-08 PROCEDURE — 77067 SCR MAMMO BI INCL CAD: CPT

## 2018-08-09 RX ORDER — LISINOPRIL 5 MG/1
TABLET ORAL
Qty: 90 TABLET | Refills: 0 | Status: SHIPPED | OUTPATIENT
Start: 2018-08-09 | End: 2018-08-30 | Stop reason: SDUPTHER

## 2018-08-11 ENCOUNTER — TELEPHONE (OUTPATIENT)
Dept: FAMILY MEDICINE CLINIC | Age: 78
End: 2018-08-11

## 2018-08-12 ENCOUNTER — TELEPHONE (OUTPATIENT)
Dept: FAMILY MEDICINE CLINIC | Age: 78
End: 2018-08-12

## 2018-08-12 NOTE — TELEPHONE ENCOUNTER
Renny Roberts 18 to verify RX and see if there was an estimated date of delivery. Pharmacy is not open on the weekends. Attempted to call pt and her niece to check on pt's headache and BP with no answer on either line. Will call Janie in the AM when they open around 8.

## 2018-08-13 ENCOUNTER — TELEPHONE (OUTPATIENT)
Dept: FAMILY MEDICINE CLINIC | Age: 78
End: 2018-08-13

## 2018-08-30 ENCOUNTER — OFFICE VISIT (OUTPATIENT)
Dept: FAMILY MEDICINE CLINIC | Age: 78
End: 2018-08-30

## 2018-08-30 VITALS
OXYGEN SATURATION: 98 % | SYSTOLIC BLOOD PRESSURE: 124 MMHG | HEIGHT: 61 IN | BODY MASS INDEX: 28.89 KG/M2 | WEIGHT: 153 LBS | HEART RATE: 68 BPM | DIASTOLIC BLOOD PRESSURE: 70 MMHG

## 2018-08-30 DIAGNOSIS — R60.0 PEDAL EDEMA: ICD-10-CM

## 2018-08-30 DIAGNOSIS — E11.40 CONTROLLED TYPE 2 DIABETES MELLITUS WITH DIABETIC NEUROPATHY, WITHOUT LONG-TERM CURRENT USE OF INSULIN (HCC): Primary | ICD-10-CM

## 2018-08-30 DIAGNOSIS — I10 ESSENTIAL HYPERTENSION: ICD-10-CM

## 2018-08-30 DIAGNOSIS — Z95.5 S/P PRIMARY ANGIOPLASTY WITH CORONARY STENT: ICD-10-CM

## 2018-08-30 DIAGNOSIS — I25.10 CORONARY ARTERY DISEASE INVOLVING NATIVE CORONARY ARTERY OF NATIVE HEART WITHOUT ANGINA PECTORIS: ICD-10-CM

## 2018-08-30 DIAGNOSIS — Z91.81 AT HIGH RISK FOR FALLS: ICD-10-CM

## 2018-08-30 DIAGNOSIS — M62.838 MUSCLE SPASM: ICD-10-CM

## 2018-08-30 DIAGNOSIS — F51.02 ADJUSTMENT INSOMNIA: ICD-10-CM

## 2018-08-30 LAB — HBA1C MFR BLD: 7.2 %

## 2018-08-30 PROCEDURE — G8427 DOCREV CUR MEDS BY ELIG CLIN: HCPCS | Performed by: INTERNAL MEDICINE

## 2018-08-30 PROCEDURE — G8598 ASA/ANTIPLAT THER USED: HCPCS | Performed by: INTERNAL MEDICINE

## 2018-08-30 PROCEDURE — G8400 PT W/DXA NO RESULTS DOC: HCPCS | Performed by: INTERNAL MEDICINE

## 2018-08-30 PROCEDURE — 1036F TOBACCO NON-USER: CPT | Performed by: INTERNAL MEDICINE

## 2018-08-30 PROCEDURE — 1123F ACP DISCUSS/DSCN MKR DOCD: CPT | Performed by: INTERNAL MEDICINE

## 2018-08-30 PROCEDURE — 4040F PNEUMOC VAC/ADMIN/RCVD: CPT | Performed by: INTERNAL MEDICINE

## 2018-08-30 PROCEDURE — 99214 OFFICE O/P EST MOD 30 MIN: CPT | Performed by: INTERNAL MEDICINE

## 2018-08-30 PROCEDURE — G8417 CALC BMI ABV UP PARAM F/U: HCPCS | Performed by: INTERNAL MEDICINE

## 2018-08-30 PROCEDURE — 1090F PRES/ABSN URINE INCON ASSESS: CPT | Performed by: INTERNAL MEDICINE

## 2018-08-30 PROCEDURE — 1101F PT FALLS ASSESS-DOCD LE1/YR: CPT | Performed by: INTERNAL MEDICINE

## 2018-08-30 PROCEDURE — 83036 HEMOGLOBIN GLYCOSYLATED A1C: CPT | Performed by: INTERNAL MEDICINE

## 2018-08-30 RX ORDER — HYDROCODONE BITARTRATE AND ACETAMINOPHEN 10; 325 MG/1; MG/1
TABLET ORAL
Qty: 180 TABLET | Refills: 0 | Status: SHIPPED | OUTPATIENT
Start: 2018-08-30 | End: 2018-10-30

## 2018-08-30 RX ORDER — CLOPIDOGREL BISULFATE 75 MG/1
75 TABLET ORAL DAILY
Qty: 90 TABLET | Refills: 1 | Status: SHIPPED | OUTPATIENT
Start: 2018-08-30 | End: 2019-05-25 | Stop reason: SDUPTHER

## 2018-08-30 RX ORDER — LISINOPRIL 5 MG/1
5 TABLET ORAL DAILY
Qty: 90 TABLET | Refills: 1 | Status: SHIPPED | OUTPATIENT
Start: 2018-08-30 | End: 2021-03-02 | Stop reason: ALTCHOICE

## 2018-08-30 RX ORDER — TRAZODONE HYDROCHLORIDE 50 MG/1
TABLET ORAL
Qty: 90 TABLET | Refills: 1 | Status: SHIPPED | OUTPATIENT
Start: 2018-08-30 | End: 2021-03-23 | Stop reason: ALTCHOICE

## 2018-08-30 RX ORDER — TIZANIDINE HYDROCHLORIDE 2 MG/1
CAPSULE, GELATIN COATED ORAL
Qty: 270 CAPSULE | Refills: 1 | Status: SHIPPED | OUTPATIENT
Start: 2018-08-30 | End: 2021-03-02 | Stop reason: ALTCHOICE

## 2018-08-30 RX ORDER — LANCETS 30 GAUGE
EACH MISCELLANEOUS
Qty: 100 EACH | Refills: 5 | Status: SHIPPED | OUTPATIENT
Start: 2018-08-30 | End: 2021-03-09 | Stop reason: SDUPTHER

## 2018-08-30 RX ORDER — METOPROLOL SUCCINATE 25 MG/1
25 TABLET, EXTENDED RELEASE ORAL DAILY
Qty: 90 TABLET | Refills: 1 | Status: SHIPPED | OUTPATIENT
Start: 2018-08-30

## 2018-08-30 RX ORDER — GLUCOSAMINE HCL/CHONDROITIN SU 500-400 MG
CAPSULE ORAL
Qty: 100 STRIP | Refills: 3 | Status: SHIPPED | OUTPATIENT
Start: 2018-08-30

## 2018-08-30 RX ORDER — GABAPENTIN 300 MG/1
CAPSULE ORAL
Qty: 270 CAPSULE | Refills: 0 | Status: SHIPPED | OUTPATIENT
Start: 2018-08-30 | End: 2021-03-02

## 2018-08-30 RX ORDER — FUROSEMIDE 40 MG/1
40 TABLET ORAL DAILY
Qty: 90 TABLET | Refills: 2 | Status: SHIPPED | OUTPATIENT
Start: 2018-08-30

## 2018-08-30 RX ORDER — ISOSORBIDE MONONITRATE 30 MG/1
30 TABLET, EXTENDED RELEASE ORAL DAILY
Qty: 90 TABLET | Refills: 1 | Status: SHIPPED | OUTPATIENT
Start: 2018-08-30 | End: 2019-05-25 | Stop reason: SDUPTHER

## 2018-08-30 RX ORDER — GLIPIZIDE 10 MG/1
10 TABLET ORAL
Qty: 270 TABLET | Refills: 1 | Status: SHIPPED | OUTPATIENT
Start: 2018-08-30 | End: 2019-05-25 | Stop reason: SDUPTHER

## 2018-08-30 NOTE — PROGRESS NOTES
NEUROPATHY. Dispense: 270 capsule; Refill: 0  - HYDROcodone-acetaminophen (NORCO)  MG per tablet; Intended supply: 30 days. Dispense: 180 tablet; Refill: 0  - glipiZIDE (GLUCOTROL) 10 MG tablet; Take 1 tablet by mouth 3 times daily (with meals)  Dispense: 270 tablet; Refill: 1  - metFORMIN (GLUCOPHAGE) 1000 MG tablet; Take 1 tablet by mouth 2 times daily (with meals)  Dispense: 180 tablet; Refill: 1    3. At high risk for falls  -home safety tips given    4. Chest pain, unspecified type    - isosorbide mononitrate (IMDUR) 30 MG extended release tablet; Take 1 tablet by mouth daily  Dispense: 90 tablet; Refill: 1    5. Coronary artery disease involving native coronary artery of native heart without angina pectoris    - isosorbide mononitrate (IMDUR) 30 MG extended release tablet; Take 1 tablet by mouth daily  Dispense: 90 tablet; Refill: 1    6. Muscle spasm    - tiZANidine (ZANAFLEX) 2 MG capsule; 1 tab tid as needed for muscle spasm  Dispense: 270 capsule; Refill: 1    7. Essential hypertension    - lisinopril (PRINIVIL;ZESTRIL) 5 MG tablet; Take 1 tablet by mouth daily  Dispense: 90 tablet; Refill: 1  - metoprolol succinate (TOPROL XL) 25 MG extended release tablet; Take 1 tablet by mouth daily  Dispense: 90 tablet; Refill: 1    8. S/P primary angioplasty with coronary stent    - clopidogrel (PLAVIX) 75 MG tablet; Take 1 tablet by mouth daily  Dispense: 90 tablet; Refill: 1    9. Pedal edema    - furosemide (LASIX) 40 MG tablet; Take 1 tablet by mouth daily  Dispense: 90 tablet; Refill: 2    10. Adjustment insomnia    - traZODone (DESYREL) 50 MG tablet; 1 tab at bedtime  For sleep  Dispense: 90 tablet;  Refill: 1    Graciela received counseling on the following healthy behaviors: nutrition, exercise and medication adherence    Patient given educational materials on Diabetes    I have instructed Graciela to complete a self tracking handout on Blood Sugars  and instructed them to bring it with them to her next appointment. Discussed use, benefit, and side effects of prescribed medications. Barriers to medication compliance addressed. All patient questions answered. Pt voiced understanding. Controlled Substances Monitoring:     RX Monitoring 8/31/2018   Attestation The Prescription Monitoring Report for this patient was reviewed today. Documentation Possible medication side effects, risk of tolerance/dependence & alternative treatments discussed. Chronic Pain Dose reduction has been attempted. Medication Contracts -       An electronic signature was used to authenticate this note.     --Eugenia Moyer MD on 8/30/2018 at 11:53 AM

## 2018-08-30 NOTE — PATIENT INSTRUCTIONS
high or too low. The most common symptoms of high blood sugar include:  · Thirst.  · Frequent urination. · Weight loss. · Blurry vision. The symptoms of low blood sugar include:  · Sweating. · Shakiness. · Weakness. · Hunger. · Confusion. How can you prevent type 2 diabetes? The best way to prevent or delay type 2 diabetes is to adopt healthy habits, which include:  · Staying at a healthy weight. · Exercising regularly. · Eating healthy foods. How is type 2 diabetes treated? If you have type 2 diabetes, here are the most important things you can do. · Take your diabetes medicines. · Check your blood sugar as often as your doctor recommends. Also, get a hemoglobin A1c test at least every 6 months. · Try to eat a variety of foods and to spread carbohydrate throughout the day. Carbohydrate raises blood sugar higher and more quickly than any other nutrient does. Carbohydrate is found in sugar, breads and cereals, fruit, starchy vegetables such as potatoes and corn, and milk and yogurt. · Get at least 30 minutes of exercise on most days of the week. Walking is a good choice. You also may want to do other activities, such as running, swimming, cycling, or playing tennis or team sports. If your doctor says it's okay, do muscle-strengthening exercises at least 2 times a week. · See your doctor for checkups and tests on a regular schedule. · If you have high blood pressure or high cholesterol, take the medicines as prescribed by your doctor. · Do not smoke. Smoking can make health problems worse. This includes problems you might have with type 2 diabetes. If you need help quitting, talk to your doctor about stop-smoking programs and medicines. These can increase your chances of quitting for good. Follow-up care is a key part of your treatment and safety. Be sure to make and go to all appointments, and call your doctor if you are having problems.  It's also a good idea to know your test results and keep a list of the medicines you take. Where can you learn more? Go to https://chpepiceweb.[employer]Â®. org and sign in to your InstaGIS account. Enter X203 in the GetIntent box to learn more about \"Learning About Type 2 Diabetes. \"     If you do not have an account, please click on the \"Sign Up Now\" link. Current as of: December 7, 2017  Content Version: 11.7  © 9051-5304 "[employer] Incorporated. Care instructions adapted under license by [facility] ([facility]). If you have questions about a medical condition or this instruction, always ask your healthcare professional. [address] any warranty or liability for your use of this information.

## 2018-08-31 ASSESSMENT — ENCOUNTER SYMPTOMS
COUGH: 0
NAUSEA: 0
VOMITING: 0
DIARRHEA: 1
CHEST TIGHTNESS: 0

## 2018-09-18 ENCOUNTER — OFFICE VISIT (OUTPATIENT)
Dept: CARDIOLOGY CLINIC | Age: 78
End: 2018-09-18

## 2018-09-18 VITALS
OXYGEN SATURATION: 98 % | SYSTOLIC BLOOD PRESSURE: 138 MMHG | DIASTOLIC BLOOD PRESSURE: 68 MMHG | WEIGHT: 151 LBS | HEIGHT: 62 IN | HEART RATE: 62 BPM | BODY MASS INDEX: 27.79 KG/M2

## 2018-09-18 DIAGNOSIS — E78.00 PURE HYPERCHOLESTEROLEMIA: ICD-10-CM

## 2018-09-18 DIAGNOSIS — I25.10 CORONARY ARTERY DISEASE INVOLVING NATIVE CORONARY ARTERY OF NATIVE HEART WITHOUT ANGINA PECTORIS: ICD-10-CM

## 2018-09-18 DIAGNOSIS — R00.2 PALPITATIONS: Primary | ICD-10-CM

## 2018-09-18 DIAGNOSIS — I10 ESSENTIAL HYPERTENSION: ICD-10-CM

## 2018-09-18 PROCEDURE — G8400 PT W/DXA NO RESULTS DOC: HCPCS | Performed by: INTERNAL MEDICINE

## 2018-09-18 PROCEDURE — 1036F TOBACCO NON-USER: CPT | Performed by: INTERNAL MEDICINE

## 2018-09-18 PROCEDURE — 1090F PRES/ABSN URINE INCON ASSESS: CPT | Performed by: INTERNAL MEDICINE

## 2018-09-18 PROCEDURE — 99214 OFFICE O/P EST MOD 30 MIN: CPT | Performed by: INTERNAL MEDICINE

## 2018-09-18 PROCEDURE — G8598 ASA/ANTIPLAT THER USED: HCPCS | Performed by: INTERNAL MEDICINE

## 2018-09-18 PROCEDURE — 4040F PNEUMOC VAC/ADMIN/RCVD: CPT | Performed by: INTERNAL MEDICINE

## 2018-09-18 PROCEDURE — G8427 DOCREV CUR MEDS BY ELIG CLIN: HCPCS | Performed by: INTERNAL MEDICINE

## 2018-09-18 PROCEDURE — G8417 CALC BMI ABV UP PARAM F/U: HCPCS | Performed by: INTERNAL MEDICINE

## 2018-09-18 PROCEDURE — 1101F PT FALLS ASSESS-DOCD LE1/YR: CPT | Performed by: INTERNAL MEDICINE

## 2018-09-18 PROCEDURE — 1123F ACP DISCUSS/DSCN MKR DOCD: CPT | Performed by: INTERNAL MEDICINE

## 2018-09-18 NOTE — PROGRESS NOTES
Other (See Comments)    Rofecoxib Swelling    Vioxx     Sulfa Antibiotics Rash     Current Outpatient Prescriptions   Medication Sig Dispense Refill    Lancets MISC Use as directed to test blood sugar three times a day E11.9 100 each 5    blood glucose monitor strips Use as directed to test blood sugar three times a day E11.9 100 strip 3    gabapentin (NEURONTIN) 300 MG capsule TAKE 1 CAPSULE THREE TIMES DAILY  FOR  NEUROPATHY. 270 capsule 0    HYDROcodone-acetaminophen (NORCO)  MG per tablet Intended supply: 30 days.  180 tablet 0    lisinopril (PRINIVIL;ZESTRIL) 5 MG tablet Take 1 tablet by mouth daily 90 tablet 1    isosorbide mononitrate (IMDUR) 30 MG extended release tablet Take 1 tablet by mouth daily 90 tablet 1    glipiZIDE (GLUCOTROL) 10 MG tablet Take 1 tablet by mouth 3 times daily (with meals) 270 tablet 1    tiZANidine (ZANAFLEX) 2 MG capsule 1 tab tid as needed for muscle spasm 270 capsule 1    metoprolol succinate (TOPROL XL) 25 MG extended release tablet Take 1 tablet by mouth daily 90 tablet 1    clopidogrel (PLAVIX) 75 MG tablet Take 1 tablet by mouth daily 90 tablet 1    furosemide (LASIX) 40 MG tablet Take 1 tablet by mouth daily 90 tablet 2    metFORMIN (GLUCOPHAGE) 1000 MG tablet Take 1 tablet by mouth 2 times daily (with meals) 180 tablet 1    traZODone (DESYREL) 50 MG tablet 1 tab at bedtime  For sleep 90 tablet 1    tiZANidine (ZANAFLEX) 4 MG tablet TAKE 1/2 TABLET THREE TIMES DAILY 135 tablet 3    simvastatin (ZOCOR) 20 MG tablet TAKE 1 TABLET EVERY NIGHT FOR CHOLESTEROL 90 tablet 1    Blood Glucose Monitoring Suppl (BLOOD GLUCOSE SYSTEM NOEL) KIT Use as directed to test blood sugar three times a day  E11.9 1 kit 0    amitriptyline (ELAVIL) 10 MG tablet TAKE 1 TABLET EVERY NIGHT AT BEDTIME 90 tablet 0    Blood Glucose Monitoring Suppl (CONTOUR NEXT ONE) KIT Test blood sugar twice a day 1 kit 1    glucose blood VI test strips (ASCENSIA AUTODISC VI;ONE TOUCH ULTRA TEST VI) strip 1 each by In Vitro route 2 times daily As needed. 100 each 5    aspirin 81 MG tablet Take 1 tablet by mouth daily 30 tablet 3     No current facility-administered medications for this visit. Review of Systems:  Review of systems is as detailed above and all other systems are normal.      Physical Exam:   /68   Pulse 62   Ht 5' 1.75\" (1.568 m)   Wt 151 lb (68.5 kg) Comment: did not wish to remove shoes  SpO2 98%   BMI 27.84 kg/m²   Wt Readings from Last 3 Encounters:   09/18/18 151 lb (68.5 kg)   08/30/18 153 lb (69.4 kg)   07/11/18 151 lb (68.5 kg)     Constitutional: She is oriented to person, place, and time. She appears well-developed and well-nourished. In no acute distress. Head: Normocephalic and atraumatic. Pupils equal and round. Neck: Neck supple. No JVP or carotid bruit appreciated. No mass and no thyromegaly present. No lymphadenopathy present. Cardiovascular: Normal rate. Normal heart sounds. Exam reveals no gallop and no friction rub.   5-6/3 systolic murmur heard at the left sternal border. Pulmonary/Chest: Effort normal and breath sounds normal. No respiratory distress. She has no wheezes, rhonchi or rales. Abdominal: Soft, non-tender. Bowel sounds are normal. She exhibits no organomegaly, mass or bruit. Extremities: No edema, cyanosis, or clubbing. Pulses are 2+ radial/dorsalis pedis/posterior tibial/carotid bilaterally. Neurological: No gross cranial nerve deficit. Coordination normal.   Skin: Skin is warm and dry. There is no rash or diaphoresis. Psychiatric: She has a normal mood and affect.  Her speech is normal and behavior is normal.     Lab Review:    Lab Results   Component Value Date    TRIG 125 05/22/2018    HDL 59 05/22/2018    HDL 65 01/19/2012    LDLCALC 71 05/22/2018    LABVLDL 25 05/22/2018     Lab Results   Component Value Date    BUN 18 05/22/2018    CREATININE 0.8 05/22/2018     EKG Interpretation: 8/16/17, sinus rhythm     Image Review:     CTA pulm w contrast:9/17/2015  IMPRESSION: Negative for pulmonary embolus. Echo:9/18/2015  Summary  Normal left ventricular size and wall thickness. Normal left ventricular systolic function with an estimated ejection fraction of 55-60% . No regional wall motion abnormalities. There is reversal of E/A inflow velocities across the mitral valve suggesting impaired left ventricular relaxation. The mitral valve appears normal in structure and function. No evidence of mitral stenosis. Trivial mitral regurgitation,tricuspid & pulmonic regurgitation present. Stress:9/18/2015  Summary   Pharmacological Stress/MPI Results:      1. Technically a satisfactory study. 2. Normal pharmacological stress portion of the study. 3. Anterior Apical ischemia   4. Gated Study shows normal LV size and Systolic function; EF is 70 %. Stress 7/20/18  Normal myocardial perfusion scan       Ejection fraction 81 %        Cath:9/23/2015  CONCLUSIONS: Successful stenting of the 2 lesions in the mid LAD. The proximal mid lesion was reduced from 80% to 0% using a 2.25 x 18 mm Resolute stent. The mid-distal lesion was reduced from 70% to 0% using a 2.25 by a 12-mm drug-coated stent. Assessment/Plan:      Palpitations  Patient denies any further palpitations . Mary Morgan She is in regular rhythm today. I will continue to follow her clinically and consider 24 hour holter monitor if her symptoms persist or worsen. HTN (hypertension)  BP is stable today. BMP from 5/2018 stable. CAD (coronary artery disease)  Pt denies symptoms of angina today. Pt is on BBlocker, Statin and Antiplatelet therapy. I have recommended her starting on zontivity but since she is moving out of the city, I will hold off . Pure hypercholesterolemia  Last lipid panel from 5/2018 was early abnormal for elevated LDL at 71 and total cholesterol of 155. Mary Morgan Pt will have liver/ lipid profile rechecked before next visit.  LDL goal is less than

## 2018-09-18 NOTE — PATIENT INSTRUCTIONS
Patient Education        Palpitations: Care Instructions  Your Care Instructions    Heart palpitations are the uncomfortable sensation that your heart is beating fast or irregularly. You might feel pounding or fluttering in your chest. It might feel like your heart is skipping a beat. Although palpitations may be caused by a heart problem, they also occur because of stress, fatigue, or use of alcohol, caffeine, or nicotine. Many medicines, including diet pills, antihistamines, decongestants, and some herbal products, can cause heart palpitations. Nearly everyone has palpitations from time to time. Depending on your symptoms, your doctor may need to do more tests to try to find the cause of your palpitations. Follow-up care is a key part of your treatment and safety. Be sure to make and go to all appointments, and call your doctor if you are having problems. It's also a good idea to know your test results and keep a list of the medicines you take. How can you care for yourself at home? · Avoid caffeine, nicotine, and excess alcohol. · Do not take illegal drugs, such as methamphetamines and cocaine. · Do not take weight loss or diet medicines unless you talk with your doctor first.  · Get plenty of sleep. · Do not overeat. · If you have palpitations again, take deep breaths and try to relax. This may slow a racing heart. · If you start to feel lightheaded, lie down to avoid injuries that might result if you pass out and fall down. · Keep a record of your palpitations and bring it to your next doctor's appointment. Write down:  ¨ The date and time. ¨ Your pulse. (If your heart is beating fast, it may be hard to count your pulse.)  ¨ What you were doing when the palpitations started. ¨ How long the palpitations lasted. ¨ Any other symptoms. · If an activity causes palpitations, slow down or stop. Talk to your doctor before you do that activity again. · Take your medicines exactly as prescribed.  Call your doctor if you think you are having a problem with your medicine. When should you call for help? Call 911 anytime you think you may need emergency care. For example, call if:    · You passed out (lost consciousness).     · You have symptoms of a heart attack. These may include:  ¨ Chest pain or pressure, or a strange feeling in the chest.  ¨ Sweating. ¨ Shortness of breath. ¨ Pain, pressure, or a strange feeling in the back, neck, jaw, or upper belly or in one or both shoulders or arms. ¨ Lightheadedness or sudden weakness. ¨ A fast or irregular heartbeat. After you call 911, the  may tell you to chew 1 adult-strength or 2 to 4 low-dose aspirin. Wait for an ambulance. Do not try to drive yourself.     · You have symptoms of a stroke. These may include:  ¨ Sudden numbness, tingling, weakness, or loss of movement in your face, arm, or leg, especially on only one side of your body. ¨ Sudden vision changes. ¨ Sudden trouble speaking. ¨ Sudden confusion or trouble understanding simple statements. ¨ Sudden problems with walking or balance. ¨ A sudden, severe headache that is different from past headaches.    Call your doctor now or seek immediate medical care if:    · You have heart palpitations and:  ¨ Are dizzy or lightheaded, or you feel like you may faint. ¨ Have new or increased shortness of breath.    Watch closely for changes in your health, and be sure to contact your doctor if:    · You continue to have heart palpitations. Where can you learn more? Go to https://FreshdeskrolandoThingies.HireWheel. org and sign in to your 2nd Watch account. Enter R508 in the TransGaming box to learn more about \"Palpitations: Care Instructions. \"     If you do not have an account, please click on the \"Sign Up Now\" link. Current as of: December 6, 2017  Content Version: 11.7  © 7160-6634 [employer]. Care instructions adapted under license by [facility] ([facility]).  If you have questions about a

## 2019-05-28 DIAGNOSIS — E11.40 CONTROLLED TYPE 2 DIABETES MELLITUS WITH DIABETIC NEUROPATHY, WITHOUT LONG-TERM CURRENT USE OF INSULIN (HCC): ICD-10-CM

## 2019-05-29 RX ORDER — GLIPIZIDE 10 MG/1
TABLET ORAL
Qty: 30 TABLET | Refills: 0 | Status: SHIPPED | OUTPATIENT
Start: 2019-05-29 | End: 2021-03-02 | Stop reason: ALTCHOICE

## 2020-02-13 RX ORDER — ISOSORBIDE MONONITRATE 30 MG/1
TABLET, EXTENDED RELEASE ORAL
Qty: 90 TABLET | Refills: 0 | OUTPATIENT
Start: 2020-02-13

## 2020-02-13 RX ORDER — GLIPIZIDE 10 MG/1
TABLET ORAL
Qty: 270 TABLET | Refills: 0 | OUTPATIENT
Start: 2020-02-13

## 2020-02-13 RX ORDER — TIZANIDINE 2 MG/1
TABLET ORAL
Qty: 270 TABLET | Refills: 0 | OUTPATIENT
Start: 2020-02-13

## 2021-03-02 ENCOUNTER — HOSPITAL ENCOUNTER (OUTPATIENT)
Dept: WOUND CARE | Age: 81
Discharge: HOME | End: 2021-03-02
Payer: MEDICARE

## 2021-03-02 VITALS
WEIGHT: 151 LBS | DIASTOLIC BLOOD PRESSURE: 72 MMHG | HEIGHT: 62 IN | BODY MASS INDEX: 27.79 KG/M2 | TEMPERATURE: 98.9 F | SYSTOLIC BLOOD PRESSURE: 132 MMHG | RESPIRATION RATE: 16 BRPM | HEART RATE: 63 BPM

## 2021-03-02 PROCEDURE — 11045 DBRDMT SUBQ TISS EACH ADDL: CPT

## 2021-03-02 PROCEDURE — 11042 DBRDMT SUBQ TIS 1ST 20SQCM/<: CPT

## 2021-03-02 PROCEDURE — 99204 OFFICE O/P NEW MOD 45 MIN: CPT

## 2021-03-02 RX ORDER — FERROUS SULFATE 325(65) MG
325 TABLET ORAL
COMMUNITY

## 2021-03-02 RX ORDER — CLINDAMYCIN HYDROCHLORIDE 300 MG/1
300 CAPSULE ORAL 3 TIMES DAILY
COMMUNITY
Start: 2021-03-26 | End: 2021-03-23 | Stop reason: ALTCHOICE

## 2021-03-02 RX ORDER — PREGABALIN 100 MG/1
100 CAPSULE ORAL 2 TIMES DAILY
COMMUNITY
End: 2021-04-13 | Stop reason: SDUPTHER

## 2021-03-02 RX ORDER — OXYCODONE AND ACETAMINOPHEN 7.5; 325 MG/1; MG/1
1 TABLET ORAL EVERY 12 HOURS PRN
COMMUNITY
End: 2021-03-30 | Stop reason: ALTCHOICE

## 2021-03-02 RX ORDER — MAGNESIUM OXIDE 400 MG/1
400 TABLET ORAL DAILY
COMMUNITY

## 2021-03-02 RX ORDER — ROSUVASTATIN CALCIUM 40 MG/1
40 TABLET, COATED ORAL EVERY EVENING
COMMUNITY

## 2021-03-02 RX ORDER — GENTAMICIN SULFATE 1 MG/G
CREAM TOPICAL
Qty: 1 TUBE | Refills: 3 | Status: SHIPPED | OUTPATIENT
Start: 2021-03-02 | End: 2021-04-20 | Stop reason: ALTCHOICE

## 2021-03-02 RX ORDER — BACLOFEN 10 MG/1
10 TABLET ORAL 2 TIMES DAILY PRN
COMMUNITY

## 2021-03-02 RX ORDER — LIDOCAINE 40 MG/G
CREAM TOPICAL ONCE
Status: DISCONTINUED | OUTPATIENT
Start: 2021-03-02 | End: 2021-03-03 | Stop reason: HOSPADM

## 2021-03-02 ASSESSMENT — PAIN SCALES - GENERAL: PAINLEVEL_OUTOF10: 0

## 2021-03-02 NOTE — PROGRESS NOTES
[facility]  Progress Note and Procedure Note      Rosa Mora  AGE: 79 y.o. GENDER: female  : 1940  TODAY'S DATE:  3/2/2021    Subjective:     Chief Complaint   Patient presents with    Wound Check         HISTORY of PRESENT ILLNESS HPI     Rosa Mora is a 79 y.o. female who presents today for wound evaluation. History of Wound: Patient notes wound is been present for at least a month on the right and for 2 weeks on the left. She admits to being diabetic for greater than 40 years. She is not been checking her blood sugar because her glucometer does not have batteries. She admits to some discomfort in the wounds bilaterally. She does admit to numbness and tingling in her feet bilaterally. She was seen at the urgent care and given an antibiotic. She denies current nausea, vomiting, fever, chills, shortness of breath or chest pain.   Wound Pain:  intermittent  Severity:  2 / 10   Wound Type:  diabetic, pressure and Possible burn  Modifying Factors:  edema, venous stasis, lymphedema, diabetes, poor glucose control, chronic pressure, decreased mobility, shear force, obesity and arterial insufficiency  Associated Signs/Symptoms:  edema, drainage and pain        PAST MEDICAL HISTORY        Diagnosis Date    Ankle swelling     Arthritis     Breast cancer ([address] 6/15/1999    CAD (coronary artery disease) 10/13/2012    Cancer ([address]     breast    Generalized headaches     HTN (hypertension) 2015    Hyperlipidemia     OA (osteoarthritis) of knee 2015    Type II or unspecified type diabetes mellitus without mention of complication, not stated as uncontrolled        PAST SURGICAL HISTORY    Past Surgical History:   Procedure Laterality Date    BACK SURGERY  ,     BREAST SURGERY      breast biopys    CAR[place]EL [place]    HYSTERECTOMY      partial       FAMILY HISTORY    Family History   Problem Relation Age of Onset  Heart Disease Brother     Hearing Loss Mother     High Blood Pressure Mother        SOCIAL HISTORY    Social History     Tobacco Use    Smoking status: Never Smoker    Smokeless tobacco: Never Used    Tobacco comment: 3/2/21   Substance Use Topics    Alcohol use: No     Alcohol/week: 0.0 standard drinks    Drug use: No       ALLERGIES    Allergies   Allergen Reactions    Alendronate Sodium Anaphylaxis    Influenza Vaccines     Pneumococcal Vaccines Other (See Comments)    Rofecoxib Swelling    Vioxx     Sulfa Antibiotics Rash       MEDICATIONS    Current Outpatient Medications on File Prior to Encounter   Medication Sig Dispense Refill    magnesium oxide (MAG-OX) 400 MG tablet Take 400 mg by mouth daily      [START ON 3/26/2021] clindamycin (CLEOCIN) 300 MG capsule Take 300 mg by mouth 3 times daily      ferrous sulfate (IRON 325) 325 (65 Fe) MG tablet Take 325 mg by mouth daily (with breakfast)      rosuvastatin (CRESTOR) 40 MG tablet Take 40 mg by mouth every evening      oxyCODONE-acetaminophen (PERCOCET) 7.5-325 MG per tablet Take 1 tablet by mouth every 12 hours as needed for Pain.  pregabalin (LYRICA) 100 MG capsule Take 100 mg by mouth 2 times daily.       baclofen (LIORESAL) 10 MG tablet Take 10 mg by mouth 2 times daily as needed      clopidogrel (PLAVIX) 75 MG tablet TAKE 1 TABLET EVERY DAY 90 tablet 0    Lancets MISC Use as directed to test blood sugar three times a day E11.9 100 each 5    blood glucose monitor strips Use as directed to test blood sugar three times a day E11.9 100 strip 3    metoprolol succinate (TOPROL XL) 25 MG extended release tablet Take 1 tablet by mouth daily (Patient taking differently: Take 50 mg by mouth 2 times daily ) 90 tablet 1    furosemide (LASIX) 40 MG tablet Take 1 tablet by mouth daily 90 tablet 2    metFORMIN (GLUCOPHAGE) 1000 MG tablet Take 1 tablet by mouth 2 times daily (with meals) 180 tablet 1    traZODone (DESYREL) 50 MG tablet 1 tab at bedtime  For sleep 90 tablet 1    Blood Glucose Monitoring Suppl (BLOOD GLUCOSE SYSTEM NOEL) KIT Use as directed to test blood sugar three times a day  E11.9 1 kit 0    Blood Glucose Monitoring Suppl (CONTOUR NEXT ONE) KIT Test blood sugar twice a day 1 kit 1    glucose blood VI test strips (ASCENSIA AUTODISC VI;ONE TOUCH ULTRA TEST VI) strip 1 each by In Vitro route 2 times daily As needed. 100 each 5    aspirin 81 MG tablet Take 1 tablet by mouth daily 30 tablet 3     No current facility-administered medications on file prior to encounter. REVIEW OF SYSTEMS    Pertinent items are noted in HPI. Objective:      /72   Pulse 63   Temp 98.9 °F (37.2 °C) (Oral)   Resp 16   Ht 5' 1.5\" (1.562 m)   Wt 151 lb (68.5 kg)   BMI 28.07 kg/m²     PHYSICAL EXAM    Vascular: Vascular status Intact  palpable pedal pulses, right DP1/4 and PT1/4, left DP1/4 and PT1/4. Hair growthAbsent  both lower extremities and feet. Skin temperature is warm to cool from pretibial area to distal digits bilateral.  Exam is negative for rubor, pallor, cyanosis or signs of acute vascular compromise bilaterally. Exam is positive for edema bilateral lower extremity. Varicosities Present bilateral lower extremity. Neuro: Neurologic status diminished bilateral with epicritic diminished, proprioceptive Absent , vibratory sensationAbsent  and protopathic Present . DTRs Present bilateral Achilles. There were no reproducible neuritic symptoms on exam bilateral feet/ankles. Derm: Ulceration to bilateral medial heels. Ecchymosis Absent  bilateral feet/foot. Musculoskeletal: Mild pain with debridement of wounds 5/5 muscle strength in/eversion and dorsi/plantarflexion bilateral feet. No gross instability noted.           Assessment:     Patient Active Problem List   Diagnosis    Diabetes mellitus [facility]    Pure hypercholesterolemia    Bilateral carpal tunnel syndrome    Lumbar radiculopathy    Diabetic peripheral neuropathy [facility]    CAD (coronary artery disease)    Pain medication agreement signed    OA (osteoarthritis) of knee    Chondrocalcinosis    Other specified diabetes mellitus with diabetic neuropathy    Chest pain on breathing    Abnormal ECG    HTN (hypertension)    Chest pain    Abnormal stress test    Single vessel coronary artery disease    S/P primary angioplasty with coronary stent    Palpitations    Mixed hyperlipidemia    Hip pain, bilateral    Hyperkalemia    Anemia       Procedure Note    Performed by: Hollie Melton DPM    Consent obtained: Yes    Time out taken:  Yes    Pain Control: Anesthetic  Anesthetic: 4% Lidocaine Cream     Debridement:Excisional Debridement    Using curette, scissors and forceps the wound was sharply debrided    down through and including the removal of epidermis, dermis and subcutaneous tissue. Devitalized Tissue Debrided:  fibrin, biofilm, slough, necrotic/eschar, exudate and callus    Pre Debridement Measurements:  Are located in the Wound Documentation Flow Sheet    Wound #: 1 and 2     Post  Debridement Measurements:  Wound 03/02/21 #1, Left Medial Heel, Diabetic Foot Ulcer, Carbajal 1, onset 2/16/21 (Active)   Wound Image   03/02/21 0840   Wound Etiology Diabetic Carbajal 1 03/02/21 0840   Wound Cleansed Irrigated with saline; Soap and water 03/02/21 0840   Wound Length (cm) 4.2 cm 03/02/21 0840   Wound Width (cm) 3.8 cm 03/02/21 0840   Wound Depth (cm) 0.2 cm 03/02/21 0840   Wound Surface Area (cm^2) 15.96 cm^2 03/02/21 0840   Wound Volume (cm^3) 3.19 cm^3 03/02/21 0840   Post-Procedure Length (cm) 4.5 cm 03/02/21 0930   Post-Procedure Width (cm) 5 cm 03/02/21 0930   Post-Procedure Depth (cm) 0.1 cm 03/02/21 0930   Post-Procedure Surface Area (cm^2) 22.5 cm^2 03/02/21 0930   Post-Procedure Volume (cm^3) 2.25 cm^3 03/02/21 0930   Wound Assessment Pink/red; Other (Comment) 03/02/21 0840   Drainage Amount Moderate 03/02/21 0840   Drainage Description Serosanguinous 03/02/21 0840   Odor None 03/02/21 0840   Zahida-wound Assessment Fragile 03/02/21 0840   Number of days: 0       Wound 03/02/21 #2, Right Medial Heel, Diabetic Foot Ulcer, Carbajal 1, onset 11/1/20 (Active)   Wound Image   03/02/21 0840   Wound Etiology Diabetic Carbajal 1 03/02/21 0840   Wound Cleansed Irrigated with saline; Soap and water 03/02/21 0840   Wound Length (cm) 1.6 cm 03/02/21 0840   Wound Width (cm) 1.5 cm 03/02/21 0840   Wound Depth (cm) 0.2 cm 03/02/21 0840   Wound Surface Area (cm^2) 2.4 cm^2 03/02/21 0840   Wound Volume (cm^3) 0.48 cm^3 03/02/21 0840   Post-Procedure Length (cm) 3 cm 03/02/21 0930   Post-Procedure Width (cm) 5 cm 03/02/21 0930   Post-Procedure Depth (cm) 0.2 cm 03/02/21 0930   Post-Procedure Surface Area (cm^2) 15 cm^2 03/02/21 0930   Post-Procedure Volume (cm^3) 3 cm^3 03/02/21 0930   Wound Assessment Pink/red 03/02/21 0840   Drainage Amount Moderate 03/02/21 0840   Drainage Description Serosanguinous 03/02/21 0840   Odor None 03/02/21 0840   Zahida-wound Assessment Fragile 03/02/21 0840   Margins Attached edges 03/02/21 0840   Number of days: 0           Total Surface Area Debrided:  37.5 sq cm to the subcutaneous tissue bilaterally    Percentage of wound debrided 100%    Bleeding:  Minimal    Hemostasis Achieved:  by pressure    Procedural Pain:  0  / 10     Post Procedural Pain:  0 / 10     Response to treatment:  Well tolerated by patient. Plan:   Patient examined and evaluated  Wounds debridement without incident  All questions answered to patient satisfaction  Patient encouraged to find out what caused the wound either pressure or a heat source possibly chemical burn  Most likely this is due to pressure or a burn  Daily dressing changes with gentamicin cream  Keep foot offloaded  Follow-up in 1 week  The nature of the patient's condition was explained in depth.  The patient was informed that their compliance to the treatment plan is paramount to successful healing and prevention of further ulceration and/or infection     Discharge Treatment  Daily dressing changes with compression keep legs elevated at all times and not active    Written Patient Discharge Instructions Given            Electronically signed by Peterson Felix DPM on 3/2/2021 at 9:59 AM

## 2021-03-02 NOTE — PLAN OF CARE
[address]:      [facility] [address] [place], [address] [place] [address]  p: [phone] f: [phone]    Ordering Center:     [address]  [address] [address]  [phone]  Dept: [phone]   Fax# [phone]    Patient Information:      Shaista Lopez  [address]   [phone]   : 1940  AGE: 79 y.o. GENDER: female   TODAYS DATE:  3/2/2021    Insurance:      PRIMARY INSURANCE:  Plan: [employer] PLUS HMO  Coverage: HU[employer] MEDICARE  Effective Date: 2016  Group Number: [unfilled]  Subscriber Number: [id number] - (Medicare Managed)    Payor/Plan Subscr  Sex Relation Sub. Ins. ID Effective Group Num   1.  [address] 1940 Female Self [id number]                                    [id number]         Patient Wound Information:     Additional ICD-10 Codes:E11.621,L97.412    Patient Active Problem List   Diagnosis Code    Diabetes mellitus ([facility] E11.9    Pure hypercholesterolemia E78.00    Bilateral carpal tunnel syndrome G56.03    Lumbar radiculopathy M54.16    Diabetic peripheral neuropathy (HCC) E11.42    CAD (coronary artery disease) I25.10    Pain medication agreement signed Z02.89    OA (osteoarthritis) of knee M17.10    Chondrocalcinosis M11.20    Other specified diabetes mellitus with diabetic neuropathy E13.40    Chest pain on breathing R07.1    Abnormal ECG R94.31    HTN (hypertension) I10    Chest pain R07.9    Abnormal stress test R94.39    Single vessel coronary artery disease I25.10    S/P primary angioplasty with coronary stent Z95.5    Palpitations R00.2    Mixed hyperlipidemia E78.2    Hip pain, bilateral M25.551, M25.552    Hyperkalemia E87.5    Anemia D64.9       WOUNDS REQUIRING DRESSING SUPPLIES:     Wound 21 #1, Left Medial Heel, Diabetic Foot Ulcer, Carbajal 1, onset 21 (Active) Wound Image   03/02/21 0958   Wound Etiology Diabetic Carbajal 1 03/02/21 0840   Dressing Status New dressing applied 03/02/21 1010   Wound Cleansed Irrigated with saline; Soap and water 03/02/21 0840   Dressing/Treatment Other (comment) 03/02/21 1010   Wound Length (cm) 4.2 cm 03/02/21 0840   Wound Width (cm) 3.8 cm 03/02/21 0840   Wound Depth (cm) 0.2 cm 03/02/21 0840   Wound Surface Area (cm^2) 15.96 cm^2 03/02/21 0840   Wound Volume (cm^3) 3.19 cm^3 03/02/21 0840   Post-Procedure Length (cm) 4.5 cm 03/02/21 0930   Post-Procedure Width (cm) 5 cm 03/02/21 0930   Post-Procedure Depth (cm) 0.1 cm 03/02/21 0930   Post-Procedure Surface Area (cm^2) 22.5 cm^2 03/02/21 0930   Post-Procedure Volume (cm^3) 2.25 cm^3 03/02/21 0930   Wound Assessment Pink/red; Other (Comment) 03/02/21 0840   Drainage Amount Moderate 03/02/21 0840   Drainage Description Serosanguinous 03/02/21 0840   Odor None 03/02/21 0840   Zahida-wound Assessment Fragile 03/02/21 0840   Number of days: 0       Wound 03/02/21 #2, Right Medial Heel, Diabetic Foot Ulcer, Carbajal 1, onset 11/1/20 (Active)   Wound Image   03/02/21 0958   Wound Etiology Diabetic Carbajal 1 03/02/21 0840   Dressing Status New dressing applied 03/02/21 1010   Wound Cleansed Irrigated with saline; Soap and water 03/02/21 0840   Dressing/Treatment Other (comment) 03/02/21 1010   Wound Length (cm) 1.6 cm 03/02/21 0840   Wound Width (cm) 1.5 cm 03/02/21 0840   Wound Depth (cm) 0.2 cm 03/02/21 0840   Wound Surface Area (cm^2) 2.4 cm^2 03/02/21 0840   Wound Volume (cm^3) 0.48 cm^3 03/02/21 0840   Post-Procedure Length (cm) 3 cm 03/02/21 0930   Post-Procedure Width (cm) 5 cm 03/02/21 0930   Post-Procedure Depth (cm) 0.2 cm 03/02/21 0930   Post-Procedure Surface Area (cm^2) 15 cm^2 03/02/21 0930   Post-Procedure Volume (cm^3) 3 cm^3 03/02/21 0930   Wound Assessment Pink/red 03/02/21 0840   Drainage Amount Moderate 03/02/21 0840   Drainage Description Serosanguinous 03/02/21 0840 Odor None 03/02/21 0840   Zahida-wound Assessment Fragile 03/02/21 0840   Margins Attached edges 03/02/21 0840   Number of days: 0          Supplies Requested :      WOUND #: 1   PRIMARY DRESSING:    Other: Gentamycin cream   Cover and Secure with: 4X4 gauze pad  Bulky roll gauze  Other Surepress     FREQUENCY OF DRESSING CHANGES:  Daily    Wound Thickness [x] Full   []Partial       WOUND #: 2   PRIMARY DRESSING:    Pharmaceutical medication Gentamycin cream   Cover and Secure with: 4X4 gauze pad  ABD pad  Bulky roll gauze     FREQUENCY OF DRESSING CHANGES:  Daily    Wound Thickness [x] Full   []Partial                     Patient Wound(s) Debrided: [x] Yes   [] No    Debridement Date: 3/2/2021    Debribement Type: Excisional/Sharp    ADDITIONAL ITEMS:  [] Gloves Small  [x] Gloves Medium [] Gloves Large [] Gloves [place]  [] Paper Tape 1\" [] Paper Tape 2\" [] Paper Tape 3\"  [x] Medipore Tape 3\"  [x] Saline  [] Skin Prep   [] Adhesive Remover   [] Cotton Tip Applicators  [] Tubular Stocking   [] Size E  [] Size G  [] Other:    Patient currently being seen by Home Health: [] Yes   [x] No    Duration for needed supplies:  []15  [x]30  []60  []90 Days    Provider Information:      PROVIDER'S NAME/NPI  Dr. Hollie Melton  NPI [id number]  I give permission to coordinate the care for this patient

## 2021-03-09 ENCOUNTER — TELEPHONE (OUTPATIENT)
Dept: FAMILY MEDICINE CLINIC | Age: 81
End: 2021-03-09

## 2021-03-09 ENCOUNTER — HOSPITAL ENCOUNTER (OUTPATIENT)
Dept: WOUND CARE | Age: 81
Discharge: HOME | End: 2021-03-09
Payer: MEDICARE

## 2021-03-09 ENCOUNTER — OFFICE VISIT (OUTPATIENT)
Dept: FAMILY MEDICINE CLINIC | Age: 81
End: 2021-03-09
Payer: MEDICARE

## 2021-03-09 VITALS
RESPIRATION RATE: 18 BRPM | BODY MASS INDEX: 28.03 KG/M2 | TEMPERATURE: 97.6 F | SYSTOLIC BLOOD PRESSURE: 104 MMHG | OXYGEN SATURATION: 99 % | HEART RATE: 65 BPM | WEIGHT: 150.8 LBS | DIASTOLIC BLOOD PRESSURE: 52 MMHG

## 2021-03-09 VITALS
DIASTOLIC BLOOD PRESSURE: 60 MMHG | SYSTOLIC BLOOD PRESSURE: 130 MMHG | BODY MASS INDEX: 27.97 KG/M2 | HEIGHT: 62 IN | TEMPERATURE: 98.5 F | HEART RATE: 66 BPM | WEIGHT: 152 LBS | RESPIRATION RATE: 18 BRPM

## 2021-03-09 DIAGNOSIS — I10 BENIGN ESSENTIAL HTN: ICD-10-CM

## 2021-03-09 DIAGNOSIS — L97.509 DIABETIC FOOT ULCER ASSOCIATED WITH TYPE 2 DIABETES MELLITUS, UNSPECIFIED LATERALITY, UNSPECIFIED PART OF FOOT, UNSPECIFIED ULCER STAGE (HCC): ICD-10-CM

## 2021-03-09 DIAGNOSIS — E78.00 PURE HYPERCHOLESTEROLEMIA: ICD-10-CM

## 2021-03-09 DIAGNOSIS — E11.621 DIABETIC FOOT ULCER ASSOCIATED WITH TYPE 2 DIABETES MELLITUS, UNSPECIFIED LATERALITY, UNSPECIFIED PART OF FOOT, UNSPECIFIED ULCER STAGE (HCC): ICD-10-CM

## 2021-03-09 LAB
CHP ED QC CHECK: ABNORMAL
GLUCOSE BLD-MCNC: 511 MG/DL (ref 70–99)
GLUCOSE BLD-MCNC: 592 MG/DL
HBA1C MFR BLD: 13.4 %
HCT VFR BLD CALC: 33.9 % (ref 36–48)
HEMOGLOBIN: 11 G/DL (ref 12–16)
MCH RBC QN AUTO: 29.1 PG (ref 26–34)
MCHC RBC AUTO-ENTMCNC: 32.6 G/DL (ref 31–36)
MCV RBC AUTO: 89.4 FL (ref 80–100)
PDW BLD-RTO: 13.2 % (ref 12.4–15.4)
PERFORMED ON: ABNORMAL
PLATELET # BLD: 140 K/UL (ref 135–450)
PMV BLD AUTO: 11 FL (ref 5–10.5)
RBC # BLD: 3.79 M/UL (ref 4–5.2)
WBC # BLD: 4.6 K/UL (ref 4–11)

## 2021-03-09 PROCEDURE — 1036F TOBACCO NON-USER: CPT | Performed by: INTERNAL MEDICINE

## 2021-03-09 PROCEDURE — 1123F ACP DISCUSS/DSCN MKR DOCD: CPT | Performed by: INTERNAL MEDICINE

## 2021-03-09 PROCEDURE — G8484 FLU IMMUNIZE NO ADMIN: HCPCS | Performed by: INTERNAL MEDICINE

## 2021-03-09 PROCEDURE — G8400 PT W/DXA NO RESULTS DOC: HCPCS | Performed by: INTERNAL MEDICINE

## 2021-03-09 PROCEDURE — 36415 COLL VENOUS BLD VENIPUNCTURE: CPT | Performed by: INTERNAL MEDICINE

## 2021-03-09 PROCEDURE — 4040F PNEUMOC VAC/ADMIN/RCVD: CPT | Performed by: INTERNAL MEDICINE

## 2021-03-09 PROCEDURE — 1090F PRES/ABSN URINE INCON ASSESS: CPT | Performed by: INTERNAL MEDICINE

## 2021-03-09 PROCEDURE — 11721 DEBRIDE NAIL 6 OR MORE: CPT

## 2021-03-09 PROCEDURE — 82962 GLUCOSE BLOOD TEST: CPT | Performed by: INTERNAL MEDICINE

## 2021-03-09 PROCEDURE — G8427 DOCREV CUR MEDS BY ELIG CLIN: HCPCS | Performed by: INTERNAL MEDICINE

## 2021-03-09 PROCEDURE — 99214 OFFICE O/P EST MOD 30 MIN: CPT | Performed by: INTERNAL MEDICINE

## 2021-03-09 PROCEDURE — G8417 CALC BMI ABV UP PARAM F/U: HCPCS | Performed by: INTERNAL MEDICINE

## 2021-03-09 PROCEDURE — 83036 HEMOGLOBIN GLYCOSYLATED A1C: CPT | Performed by: INTERNAL MEDICINE

## 2021-03-09 PROCEDURE — 11042 DBRDMT SUBQ TIS 1ST 20SQCM/<: CPT

## 2021-03-09 RX ORDER — LANCETS 30 GAUGE
EACH MISCELLANEOUS
Qty: 100 EACH | Refills: 5 | Status: SHIPPED | OUTPATIENT
Start: 2021-03-09

## 2021-03-09 RX ORDER — BLOOD-GLUCOSE METER
KIT MISCELLANEOUS
Qty: 1 KIT | Refills: 0 | Status: SHIPPED | OUTPATIENT
Start: 2021-03-09

## 2021-03-09 RX ORDER — INSULIN GLARGINE 100 [IU]/ML
15 INJECTION, SOLUTION SUBCUTANEOUS NIGHTLY
Qty: 5 PEN | Refills: 1 | Status: SHIPPED | OUTPATIENT
Start: 2021-03-09 | End: 2021-04-23 | Stop reason: SDUPTHER

## 2021-03-09 RX ORDER — LIDOCAINE 40 MG/G
CREAM TOPICAL ONCE
Status: DISCONTINUED | OUTPATIENT
Start: 2021-03-09 | End: 2021-03-10 | Stop reason: HOSPADM

## 2021-03-09 ASSESSMENT — PATIENT HEALTH QUESTIONNAIRE - PHQ9
1. LITTLE INTEREST OR PLEASURE IN DOING THINGS: 0
SUM OF ALL RESPONSES TO PHQ9 QUESTIONS 1 & 2: 1
SUM OF ALL RESPONSES TO PHQ QUESTIONS 1-9: 1
2. FEELING DOWN, DEPRESSED OR HOPELESS: 1

## 2021-03-09 ASSESSMENT — PAIN SCALES - GENERAL: PAINLEVEL_OUTOF10: 7

## 2021-03-09 ASSESSMENT — PAIN DESCRIPTION - PROGRESSION: CLINICAL_PROGRESSION: GRADUALLY IMPROVING

## 2021-03-09 ASSESSMENT — PAIN DESCRIPTION - FREQUENCY: FREQUENCY: INTERMITTENT

## 2021-03-09 ASSESSMENT — PAIN - FUNCTIONAL ASSESSMENT: PAIN_FUNCTIONAL_ASSESSMENT: PREVENTS OR INTERFERES SOME ACTIVE ACTIVITIES AND ADLS

## 2021-03-09 ASSESSMENT — PAIN DESCRIPTION - DESCRIPTORS: DESCRIPTORS: OTHER (COMMENT)

## 2021-03-09 NOTE — TELEPHONE ENCOUNTER
----- Message from Lexy Greenwood sent at 3/9/2021  1:37 PM EST -----  Subject: Message to Provider    QUESTIONS  Information for Provider? Patient would like Dr. Jono Dick to call in insulin at   the [facility] in [facility]. ---------------------------------------------------------------------------  --------------  Familia CURRY  What is the best way for the office to contact you? OK to leave message on   voicemail  Preferred Call Back Phone Number? [phone]  ---------------------------------------------------------------------------  --------------  SCRIPT ANSWERS  Relationship to Patient?  Self

## 2021-03-09 NOTE — PATIENT INSTRUCTIONS
a good, quick way to make sure that you have a balanced meal. It also helps you spread carbs throughout the day. ? Divide your plate by types of foods. Put non-starchy vegetables on half the plate, meat or other protein food on one-quarter of the plate, and a grain or starchy vegetable in the final quarter of the plate. To this you can add a small piece of fruit and 1 cup of milk or yogurt, depending on how many carbs you are supposed to eat at a meal.  · Try to eat about the same amount of carbs at each meal. Do not \"save up\" your daily allowance of carbs to eat at one meal.  · Proteins have very little or no carbs per serving. Examples of proteins are beef, chicken, turkey, fish, eggs, tofu, cheese, cottage cheese, and peanut butter. A serving size of meat is 3 ounces, which is about the size of a deck of cards. Examples of meat substitute serving sizes (equal to 1 ounce of meat) are 1/4 cup of cottage cheese, 1 egg, 1 tablespoon of peanut butter, and ½ cup of tofu. How can you eat out and still eat healthy? · Learn to estimate the serving sizes of foods that have carbohydrate. If you measure food at home, it will be easier to estimate the amount in a serving of restaurant food. · If the meal you order has too much carbohydrate (such as potatoes, corn, or baked beans), ask to have a low-carbohydrate food instead. Ask for a salad or green vegetables. · If you use insulin, check your blood sugar before and after eating out to help you plan how much to eat in the future. · If you eat more carbohydrate at a meal than you had planned, take a walk or do other exercise. This will help lower your blood sugar. What else should you know? · Limit saturated fat, such as the fat from meat and dairy products. This is a healthy choice because people who have diabetes are at higher risk of heart disease. So choose lean cuts of meat and nonfat or low-fat dairy products.  Use olive or canola oil instead of butter or shortening when cooking. · Don't skip meals. Your blood sugar may drop too low if you skip meals and take insulin or certain medicines for diabetes. · Check with your doctor before you drink alcohol. Alcohol can cause your blood sugar to drop too low. Alcohol can also cause a bad reaction if you take certain diabetes medicines. Follow-up care is a key part of your treatment and safety. Be sure to make and go to all appointments, and call your doctor if you are having problems. It's also a good idea to know your test results and keep a list of the medicines you take. Where can you learn more? Go to https://HireIQ Solutionspepiceweb.[employer]. org and sign in to your [employer] account. Enter L800 in the Mojeek box to learn more about \"Learning About Diabetes Food Guidelines. \"     If you do not have an account, please click on the \"Sign Up Now\" link. Current as of: December 20, 2019               Content Version: 12.6  © 4809-0035 [employer]. Care instructions adapted under license by [facility] ([facility]). If you have questions about a medical condition or this instruction, always ask your healthcare professional. [address] any warranty or liability for your use of this information. Patient Education        Learning About Diabetes Food Guidelines  Your Care Instructions     Meal planning is important to manage diabetes. It helps keep your blood sugar at a target level (which you set with your doctor). You don't have to eat special foods. You can eat what your family eats, including sweets once in a while. But you do have to pay attention to how often you eat and how much you eat of certain foods. You may want to work with a dietitian or a certified diabetes educator (CDE) to help you plan meals and snacks. A dietitian or CDE can also help you lose weight if that is one of your goals. What should you know about eating carbs?   Managing the amount of carbohydrate (carbs) you eat is an important part of healthy meals when you have diabetes. Carbohydrate is found in many foods. · Learn which foods have carbs. And learn the amounts of carbs in different foods. ? Bread, cereal, pasta, and rice have about 15 grams of carbs in a serving. A serving is 1 slice of bread (1 ounce), ½ cup of cooked cereal, or 1/3 cup of cooked pasta or rice. ? Fruits have 15 grams of carbs in a serving. A serving is 1 small fresh fruit, such as an apple or orange; ½ of a banana; ½ cup of cooked or canned fruit; ½ cup of fruit juice; 1 cup of melon or raspberries; or 2 tablespoons of dried fruit. ? Milk and no-sugar-added yogurt have 15 grams of carbs in a serving. A serving is 1 cup of milk or 2/3 cup of no-sugar-added yogurt. ? Starchy vegetables have 15 grams of carbs in a serving. A serving is ½ cup of mashed potatoes or sweet potato; 1 cup winter squash; ½ of a small baked potato; ½ cup of cooked beans; or ½ cup cooked corn or green peas. · Learn how much carbs to eat each day and at each meal. A dietitian or CDE can teach you how to keep track of the amount of carbs you eat. This is called carbohydrate counting. · If you are not sure how to count carbohydrate grams, use the Plate Method to plan meals. It is a good, quick way to make sure that you have a balanced meal. It also helps you spread carbs throughout the day. ? Divide your plate by types of foods. Put non-starchy vegetables on half the plate, meat or other protein food on one-quarter of the plate, and a grain or starchy vegetable in the final quarter of the plate. To this you can add a small piece of fruit and 1 cup of milk or yogurt, depending on how many carbs you are supposed to eat at a meal.  · Try to eat about the same amount of carbs at each meal. Do not \"save up\" your daily allowance of carbs to eat at one meal.  · Proteins have very little or no carbs per serving.  Examples of proteins are beef, chicken, turkey, fish, eggs, tofu, cheese, cottage cheese, and peanut butter. A serving size of meat is 3 ounces, which is about the size of a deck of cards. Examples of meat substitute serving sizes (equal to 1 ounce of meat) are 1/4 cup of cottage cheese, 1 egg, 1 tablespoon of peanut butter, and ½ cup of tofu. How can you eat out and still eat healthy? · Learn to estimate the serving sizes of foods that have carbohydrate. If you measure food at home, it will be easier to estimate the amount in a serving of restaurant food. · If the meal you order has too much carbohydrate (such as potatoes, corn, or baked beans), ask to have a low-carbohydrate food instead. Ask for a salad or green vegetables. · If you use insulin, check your blood sugar before and after eating out to help you plan how much to eat in the future. · If you eat more carbohydrate at a meal than you had planned, take a walk or do other exercise. This will help lower your blood sugar. What else should you know? · Limit saturated fat, such as the fat from meat and dairy products. This is a healthy choice because people who have diabetes are at higher risk of heart disease. So choose lean cuts of meat and nonfat or low-fat dairy products. Use olive or canola oil instead of butter or shortening when cooking. · Don't skip meals. Your blood sugar may drop too low if you skip meals and take insulin or certain medicines for diabetes. · Check with your doctor before you drink alcohol. Alcohol can cause your blood sugar to drop too low. Alcohol can also cause a bad reaction if you take certain diabetes medicines. Follow-up care is a key part of your treatment and safety. Be sure to make and go to all appointments, and call your doctor if you are having problems. It's also a good idea to know your test results and keep a list of the medicines you take. Where can you learn more? Go to https://nicholas.healthPeerJ. org and sign in to your [employer] account.  Enter S567 in

## 2021-03-09 NOTE — TELEPHONE ENCOUNTER
Allen Lopez (family friend) called patient was here for an appointment today and forgot to ask for glucose meter.   She needs a new meter and supplies Contour next is the meter she wants

## 2021-03-09 NOTE — PLAN OF CARE
Pt seen in today in [facility] - Left heel healed - will cont with aquaphor to fragile skin and cont to cover as directed - Right heel wound debrided per dr. Luis Tobias - - will cont current treatment w/ gentamycin cream- pt reported that she has not received supplies from [facility] - encouraged pt to call  and I will check status of order also - Pt given off loading boots today also - instructed not to walk in boots - Also checked FSBS on pt today -511- pt has appt with PCP later today . Pt has broken glucometer at home .  Reviewed AVS - f/u in 1 week

## 2021-03-10 ENCOUNTER — TELEPHONE (OUTPATIENT)
Dept: FAMILY MEDICINE CLINIC | Age: 81
End: 2021-03-10

## 2021-03-10 LAB
A/G RATIO: 1.2 (ref 1.1–2.2)
ALBUMIN SERPL-MCNC: 3.8 G/DL (ref 3.4–5)
ALP BLD-CCNC: 116 U/L (ref 40–129)
ALT SERPL-CCNC: 14 U/L (ref 10–40)
ANION GAP SERPL CALCULATED.3IONS-SCNC: 15 MMOL/L (ref 3–16)
AST SERPL-CCNC: 13 U/L (ref 15–37)
BILIRUB SERPL-MCNC: 0.4 MG/DL (ref 0–1)
BUN BLDV-MCNC: 24 MG/DL (ref 7–20)
CALCIUM SERPL-MCNC: 9.4 MG/DL (ref 8.3–10.6)
CHLORIDE BLD-SCNC: 95 MMOL/L (ref 99–110)
CHOLESTEROL, TOTAL: 101 MG/DL (ref 0–199)
CO2: 26 MMOL/L (ref 21–32)
CREAT SERPL-MCNC: 1.8 MG/DL (ref 0.6–1.2)
GFR AFRICAN AMERICAN: 33
GFR NON-AFRICAN AMERICAN: 27
GLOBULIN: 3.3 G/DL
GLUCOSE BLD-MCNC: 552 MG/DL (ref 70–99)
HDLC SERPL-MCNC: 41 MG/DL (ref 40–60)
LDL CHOLESTEROL CALCULATED: 19 MG/DL
POTASSIUM SERPL-SCNC: 3.7 MMOL/L (ref 3.5–5.1)
SODIUM BLD-SCNC: 136 MMOL/L (ref 136–145)
TOTAL PROTEIN: 7.1 G/DL (ref 6.4–8.2)
TRIGL SERPL-MCNC: 203 MG/DL (ref 0–150)
VLDLC SERPL CALC-MCNC: 41 MG/DL

## 2021-03-10 NOTE — TELEPHONE ENCOUNTER
Ask Kev Felton if she started on her insulin today, and to stop her metformin What was her blood sugar today?      Aidan Mckenzie

## 2021-03-10 NOTE — TELEPHONE ENCOUNTER
[address] called. On her diabetic supplies I gave a verbal on the test strips to test as 3 times a day to match monitor and lancet directions.     If this is not ok please contact pharmacy

## 2021-03-10 NOTE — TELEPHONE ENCOUNTER
[address] called office said that they received blood glucose monitoring meter and asked so insurance will cover [employer] meter instead. I informed him it was ok.

## 2021-03-11 RX ORDER — PEN NEEDLE, DIABETIC 29 GAUGE
1 NEEDLE, DISPOSABLE MISCELLANEOUS DAILY
Qty: 100 EACH | Refills: 3 | Status: SHIPPED | OUTPATIENT
Start: 2021-03-11 | End: 2021-03-14 | Stop reason: SDUPTHER

## 2021-03-12 ENCOUNTER — TELEPHONE (OUTPATIENT)
Dept: FAMILY MEDICINE CLINIC | Age: 81
End: 2021-03-12

## 2021-03-13 ASSESSMENT — ENCOUNTER SYMPTOMS
VOMITING: 0
DIARRHEA: 1
NAUSEA: 0
CHEST TIGHTNESS: 0
COUGH: 0

## 2021-03-14 ENCOUNTER — TELEPHONE (OUTPATIENT)
Dept: FAMILY MEDICINE CLINIC | Age: 81
End: 2021-03-14

## 2021-03-14 RX ORDER — PEN NEEDLE, DIABETIC 29 GAUGE
1 NEEDLE, DISPOSABLE MISCELLANEOUS DAILY
Qty: 100 EACH | Refills: 3 | Status: SHIPPED | OUTPATIENT
Start: 2021-03-14

## 2021-03-14 RX ORDER — DOCUSATE SODIUM 100 MG/1
100 CAPSULE, LIQUID FILLED ORAL 2 TIMES DAILY
Qty: 60 CAPSULE | Refills: 0 | Status: SHIPPED | OUTPATIENT
Start: 2021-03-14 | End: 2021-04-13

## 2021-03-14 NOTE — PROGRESS NOTES
Negative. Musculoskeletal: Positive for arthralgias. Neurological: Positive for numbness. Negative for dizziness and headaches. Pain and numbness both feet. And both hands     Hematological: Does not bruise/bleed easily. Psychiatric/Behavioral: Positive for sleep disturbance. Physical Exam  Vitals signs and nursing note reviewed. Constitutional:       Appearance: She is well-developed. HENT:      Head: Normocephalic. Eyes:      Pupils: Pupils are equal, round, and reactive to light. Neck:      Musculoskeletal: Normal range of motion and neck supple. Cardiovascular:      Rate and Rhythm: Normal rate and regular rhythm. Pulmonary:      Effort: Pulmonary effort is normal.      Breath sounds: Normal breath sounds. Abdominal:      Palpations: Abdomen is soft. Musculoskeletal: Normal range of motion. Skin:     Comments: Feet ulcers   Neurological:      Mental Status: She is alert and oriented to person, place, and time. Comments: Bilateral legs peripheral neuropathy     Instruction;  Start on insulin when she gets home, prescription fro insulin,needles and lancets sent to the pharmacy. Check her blood sugar in AM and PM.   Start  with 15 units tonight and call office daily and will instruct patient  How much insulin to take  See her next week for follow up  Need to bring her blood sugar readins to adjust her insulin    On this date 3/9/2021 I reviewed previous notes, test results and face to face with the patient discussing the diagnosis and importance of compliance with the treatment plan as well as documenting on the day of the visit. An electronic signature was used to authenticate this note.     --Mary Rubalcava MD

## 2021-03-14 NOTE — TELEPHONE ENCOUNTER
On call communication: patient called in with complaint of acute on chronic constipation. Has been an going struggle for her, on percocet. Patient has tried exlax without improvement of sxs. Did have some blood with last bowel movement. In the toilet, has not recurred. Last BM 4 days ago. Denies abdominal pain, nausea and vomiting. Last cbc 3/9 with mild anemia, improved from last check. Recommend patient start colace BID. Considered sending in lactulose but pt has uncontrolled DM. Patient also instructed to increase water intake and exercise as tolerated. If no bowel movement in the next 48 hours, recommend calling office. To ER with worsening abdominal pain, new nausea vomiting, or blood in stools. Pt voiced understanding. Also requesting pen needles be sent in for her insulin. Script sent.

## 2021-03-15 ENCOUNTER — TELEPHONE (OUTPATIENT)
Dept: FAMILY MEDICINE CLINIC | Age: 81
End: 2021-03-15

## 2021-03-15 NOTE — TELEPHONE ENCOUNTER
How much insulin is she taking, do not increase dose anymore, make sure blood sugar  Does not go down below 100. How much is she taking now?

## 2021-03-16 ENCOUNTER — HOSPITAL ENCOUNTER (OUTPATIENT)
Dept: WOUND CARE | Age: 81
Discharge: HOME | End: 2021-03-16
Payer: MEDICARE

## 2021-03-16 VITALS
DIASTOLIC BLOOD PRESSURE: 54 MMHG | BODY MASS INDEX: 28.3 KG/M2 | HEIGHT: 62 IN | RESPIRATION RATE: 18 BRPM | HEART RATE: 60 BPM | WEIGHT: 153.8 LBS | TEMPERATURE: 99.5 F | SYSTOLIC BLOOD PRESSURE: 103 MMHG

## 2021-03-16 PROCEDURE — 11042 DBRDMT SUBQ TIS 1ST 20SQCM/<: CPT

## 2021-03-16 RX ORDER — LIDOCAINE 40 MG/G
CREAM TOPICAL PRN
Status: DISCONTINUED | OUTPATIENT
Start: 2021-03-16 | End: 2021-03-17 | Stop reason: HOSPADM

## 2021-03-16 NOTE — PROGRESS NOTES
[facility]  Progress Note and Procedure Note      Rosa Mora  AGE: 79 y.o. GENDER: female  : 1940  TODAY'S DATE:  3/16/2021    Subjective:     Chief Complaint   Patient presents with    Wound Check         HISTORY of PRESENT ILLNESS HPI     Rosa Mora is a 79 y.o. female who presents today for wound evaluation. History of Wound: Patient notes wound is been present for at least a month on the right and for 2 weeks on the left. She admits to being diabetic for greater than 40 years. She states that she has not been using her offloading boots because it is hard for her to turn over in bed with her mom. She has been checking her blood sugar and it was 190 this morning. She denies current nausea, vomiting, fever, chills, shortness of breath or chest pain.   Wound Pain:  intermittent  Severity:  2 / 10   Wound Type:  diabetic, pressure and Possible burn  Modifying Factors:  edema, venous stasis, lymphedema, diabetes, poor glucose control, chronic pressure, decreased mobility, shear force, obesity and arterial insufficiency  Associated Signs/Symptoms:  edema, drainage and pain        PAST MEDICAL HISTORY        Diagnosis Date    Ankle swelling     Arthritis     Breast cancer ([facility] [address] 6/15/1999    CAD (coronary artery disease) 10/13/2012    Cancer ([facility] [address]     breast    Generalized headaches     HTN (hypertension) 2015    Hyperlipidemia     OA (osteoarthritis) of knee 2015    Type II or unspecified type diabetes mellitus without mention of complication, not stated as uncontrolled        PAST SURGICAL HISTORY    Past Surgical History:   Procedure Laterality Date    BACK SURGERY  ,     BREAST SURGERY      breast biopys    [place]EL [place] 2017    [place]    HYSTERECTOMY      partial       FAMILY HISTORY    Family History   Problem Relation Age of Onset    Heart Disease Brother     Hearing Loss Mother     High Blood Pressure Mother        SOCIAL HISTORY    Social History     Tobacco Use    Smoking status: Never Smoker    Smokeless tobacco: Never Used    Tobacco comment: 3/2/21   Substance Use Topics    Alcohol use: No     Alcohol/week: 0.0 standard drinks    Drug use: No       ALLERGIES    Allergies   Allergen Reactions    Alendronate Sodium Anaphylaxis    Influenza Vaccines     Pneumococcal Vaccines Other (See Comments)    Rofecoxib Swelling    Vioxx     Sulfa Antibiotics Rash       MEDICATIONS    Current Outpatient Medications on File Prior to Encounter   Medication Sig Dispense Refill    docusate sodium (COLACE) 100 MG capsule Take 1 capsule by mouth 2 times daily 60 capsule 0    Insulin Pen Needle ([employer] PEN NEEDLES 29G) 29G X 12MM MISC 1 each by Does not apply route daily 100 each 3    insulin glargine (LANTUS SOLOSTAR) 100 UNIT/ML injection pen Inject 15 Units into the skin nightly (Patient taking differently: Inject 30 Units into the skin nightly ) 5 pen 1    Blood Glucose Monitoring Suppl (BLOOD GLUCOSE SYSTEM NOEL) KIT Use as directed to test blood sugar three times a day  E11.9 1 kit 0    blood glucose test strips (ASCENSIA AUTODISC VI;ONE TOUCH ULTRA TEST VI) strip 1 each by In Vitro route 2 times daily As needed. 100 each 5    Lancets MISC Use as directed to test blood sugar three times a day E11.9 100 each 5    magnesium oxide (MAG-OX) 400 MG tablet Take 400 mg by mouth daily      ferrous sulfate (IRON 325) 325 (65 Fe) MG tablet Take 325 mg by mouth daily (with breakfast)      rosuvastatin (CRESTOR) 40 MG tablet Take 40 mg by mouth every evening      oxyCODONE-acetaminophen (PERCOCET) 7.5-325 MG per tablet Take 1 tablet by mouth every 12 hours as needed for Pain.  pregabalin (LYRICA) 100 MG capsule Take 100 mg by mouth 2 times daily.       baclofen (LIORESAL) 10 MG tablet Take 10 mg by mouth 2 times daily as needed      gentamicin (GARAMYCIN) 0.1 % cream Apply topically daily wtih dressing changes. 1 Tube 3    clopidogrel (PLAVIX) 75 MG tablet TAKE 1 TABLET EVERY DAY 90 tablet 0    blood glucose monitor strips Use as directed to test blood sugar three times a day E11.9 100 strip 3    metoprolol succinate (TOPROL XL) 25 MG extended release tablet Take 1 tablet by mouth daily (Patient taking differently: Take 50 mg by mouth 2 times daily ) 90 tablet 1    furosemide (LASIX) 40 MG tablet Take 1 tablet by mouth daily 90 tablet 2    Blood Glucose Monitoring Suppl (CONTOUR NEXT ONE) KIT Test blood sugar twice a day 1 kit 1    aspirin 81 MG tablet Take 1 tablet by mouth daily 30 tablet 3    [START ON 3/26/2021] clindamycin (CLEOCIN) 300 MG capsule Take 300 mg by mouth 3 times daily Pt completed this med      metFORMIN (GLUCOPHAGE) 1000 MG tablet Take 1 tablet by mouth 2 times daily (with meals) (Patient taking differently: Take 1,000 mg by mouth 2 times daily (with meals) Pt not taking anymore) 180 tablet 1    traZODone (DESYREL) 50 MG tablet 1 tab at bedtime  For sleep (Patient taking differently: Patient not taking anymore) 90 tablet 1     No current facility-administered medications on file prior to encounter. REVIEW OF SYSTEMS    Pertinent items are noted in HPI. Objective:      BP (!) 103/54   Pulse 60   Temp 99.5 °F (37.5 °C) (Oral)   Resp 18   Ht 5' 1.5\" (1.562 m)   Wt 153 lb 12.8 oz (69.8 kg)   BMI 28.59 kg/m²     PHYSICAL EXAM    Vascular: Vascular status Intact  palpable pedal pulses, right DP1/4 and PT1/4, left DP1/4 and PT1/4. Hair growthAbsent  both lower extremities and feet. Skin temperature is warm to cool from pretibial area to distal digits bilateral.  Exam is negative for rubor, pallor, cyanosis or signs of acute vascular compromise bilaterally. Exam is positive for edema bilateral lower extremity. Varicosities Present bilateral lower extremity.    Neuro: Neurologic status diminished bilateral with epicritic diminished, proprioceptive Absent , vibratory sensationAbsent  and protopathic Present. DTRs Present bilateral Achilles. There were no reproducible neuritic symptoms on exam bilateral feet/ankles. Derm: Ulceration to bilateral medial heels. Ecchymosis Absent  bilateral feet/foot. Musculoskeletal: Mild pain with debridement of wounds 5/5 muscle strength in/eversion and dorsi/plantarflexion bilateral feet. No gross instability noted. Assessment:     Patient Active Problem List   Diagnosis    Diabetes mellitus ([facility] [address]    Pure hypercholesterolemia    Bilateral carpal tunnel syndrome    Lumbar radiculopathy    Diabetic peripheral neuropathy ([facility] [address]    CAD (coronary artery disease)    Pain medication agreement signed    OA (osteoarthritis) of knee    Chondrocalcinosis    Other specified diabetes mellitus with diabetic neuropathy    Chest pain on breathing    Abnormal ECG    HTN (hypertension)    Chest pain    Abnormal stress test    Single vessel coronary artery disease    S/P primary angioplasty with coronary stent    Palpitations    Mixed hyperlipidemia    Hip pain, bilateral    Hyperkalemia    Anemia       Procedure Note    Performed by: Aristides Rizzo DPM    Consent obtained: Yes    Time out taken:  Yes    Pain Control: Anesthetic  Anesthetic: 4% Lidocaine Cream     Debridement:Excisional Debridement    Using curette, scissors and forceps the wound was sharply debrided    down through and including the removal of epidermis, dermis and subcutaneous tissue.         Devitalized Tissue Debrided:  fibrin, biofilm, slough, necrotic/eschar, exudate and callus    Pre Debridement Measurements:  Are located in the Wound Documentation Flow Sheet    Wound #: 1 and 2     Wound Care Documentation:  Wound 03/02/21 #2, Right Medial Heel, Diabetic Foot Ulcer, Carbajal 1, onset 11/1/20 (Active)   Wound Image   03/02/21 0958   Wound Etiology Diabetic Carbajal 1 03/16/21 1107   Dressing Status New dressing applied 03/02/21 1010   Wound Cleansed Wound cleanser 03/16/21 1107   Dressing/Treatment Other (comment) 03/02/21 1010   Offloading for Diabetic Foot Ulcers Offloading boot 03/09/21 0917   Wound Length (cm) 2.5 cm 03/16/21 1107   Wound Width (cm) 4 cm 03/16/21 1107   Wound Depth (cm) 0.2 cm 03/16/21 1107   Wound Surface Area (cm^2) 10 cm^2 03/16/21 1107   Change in Wound Size % (l*w) -316.67 03/16/21 1107   Wound Volume (cm^3) 2 cm^3 03/16/21 1107   Wound Healing % -317 03/16/21 1107   Post-Procedure Length (cm) 2.5 cm 03/16/21 1136   Post-Procedure Width (cm) 4 cm 03/16/21 1136   Post-Procedure Depth (cm) 0.2 cm 03/16/21 1136   Post-Procedure Surface Area (cm^2) 10 cm^2 03/16/21 1136   Post-Procedure Volume (cm^3) 2 cm^3 03/16/21 1136   Distance Tunneling (cm) 0 cm 03/09/21 0821   Undermining Maxium Distance (cm) 0 03/09/21 0821   Wound Assessment Pink/red; Other (Comment);Dry 03/16/21 1107   Drainage Amount Scant 03/16/21 1107   Drainage Description Serosanguinous 03/16/21 1107   Odor None 03/16/21 1107   Zahida-wound Assessment Intact 03/16/21 1107   Margins Attached edges 03/02/21 0840   Number of days: 14         Total Surface Area Debrided: 10 Square centimeters into the subcutaneous of the right heel    Percentage of wound debrided 100%    Bleeding:  Minimal    Hemostasis Achieved:  by pressure    Procedural Pain:  0  / 10     Post Procedural Pain:  0 / 10     Response to treatment:  Well tolerated by patient. Plan:   Patient examined and evaluated  Wound right without incident  All questions answered to patient satisfaction  Again encouraged appropriate offloading of the heel including offloading boots. Most likely this is due to pressure or a burn  Daily dressing changes with gentamicin cream  reinforced a diabetic diet and taking medications appropriately she is to see her primary care later this week. Keep foot offloaded  Follow-up in 1 week  The nature of the patient's condition was explained in depth.  The patient was informed that their compliance to the treatment plan is paramount to successful healing and prevention of further ulceration and/or infection     Discharge Treatment  Daily dressing changes with compression keep legs elevated at all times and not active    Written Patient Discharge Instructions Given            Electronically signed by Meseret Flores DPM on 3/16/2021 at 11:51 AM

## 2021-03-16 NOTE — PLAN OF CARE
[facility] remains healed - very fragile  and Right heel improved - debride per Dr. Rodriguez Click - cont current treatment with gentamycin cream to open wound - stressed off loading of heels - encouraged to wear boots to off load  And elevate for reducing edema - reviewed AVS - Surepress for compression- f/u in 1 week

## 2021-03-17 ENCOUNTER — OFFICE VISIT (OUTPATIENT)
Dept: FAMILY MEDICINE CLINIC | Age: 81
End: 2021-03-17
Payer: MEDICARE

## 2021-03-17 VITALS
TEMPERATURE: 97.1 F | HEIGHT: 62 IN | BODY MASS INDEX: 28.59 KG/M2 | DIASTOLIC BLOOD PRESSURE: 60 MMHG | SYSTOLIC BLOOD PRESSURE: 102 MMHG | HEART RATE: 61 BPM | OXYGEN SATURATION: 98 %

## 2021-03-17 DIAGNOSIS — K59.03 DRUG-INDUCED CONSTIPATION: Primary | ICD-10-CM

## 2021-03-17 DIAGNOSIS — E11.40 TYPE 2 DIABETES MELLITUS WITH DIABETIC NEUROPATHY, WITH LONG-TERM CURRENT USE OF INSULIN (HCC): ICD-10-CM

## 2021-03-17 DIAGNOSIS — Z79.4 TYPE 2 DIABETES MELLITUS WITH DIABETIC NEUROPATHY, WITH LONG-TERM CURRENT USE OF INSULIN (HCC): ICD-10-CM

## 2021-03-17 PROCEDURE — 1036F TOBACCO NON-USER: CPT | Performed by: INTERNAL MEDICINE

## 2021-03-17 PROCEDURE — G8400 PT W/DXA NO RESULTS DOC: HCPCS | Performed by: INTERNAL MEDICINE

## 2021-03-17 PROCEDURE — 1090F PRES/ABSN URINE INCON ASSESS: CPT | Performed by: INTERNAL MEDICINE

## 2021-03-17 PROCEDURE — 4040F PNEUMOC VAC/ADMIN/RCVD: CPT | Performed by: INTERNAL MEDICINE

## 2021-03-17 PROCEDURE — 99213 OFFICE O/P EST LOW 20 MIN: CPT | Performed by: INTERNAL MEDICINE

## 2021-03-17 PROCEDURE — G8417 CALC BMI ABV UP PARAM F/U: HCPCS | Performed by: INTERNAL MEDICINE

## 2021-03-17 PROCEDURE — 1123F ACP DISCUSS/DSCN MKR DOCD: CPT | Performed by: INTERNAL MEDICINE

## 2021-03-17 PROCEDURE — G8484 FLU IMMUNIZE NO ADMIN: HCPCS | Performed by: INTERNAL MEDICINE

## 2021-03-17 PROCEDURE — G8427 DOCREV CUR MEDS BY ELIG CLIN: HCPCS | Performed by: INTERNAL MEDICINE

## 2021-03-17 NOTE — PROGRESS NOTES
21    Sapphire Victoria (: 1940) is a 79 y.o. female, here for evaluation of the following medical concerns:    HPI; Treatment Adherence:   Medication compliance:  {Desc; compliance:5303::\"compliant most of the time\"}  Diet compliance:  {Desc; compliance:5303::\"compliant most of the time\"}  Weight trend: {INCREASING/DECREASING/STABLE:45886}  Current exercise: {EXERCISE IUXA:963192927}  Barriers: {Barriers to success:18188}    Hypertension:  Home blood pressure monitoring: {NO/YES:1695417510::\"No\"}. Patient {denies/complains:89408} {Symptoms of Hypertension, Denies:69393}. Antihypertensive medication side effects: {Hypertension med side effects:5728::\"no medication side effects noted\"}. Use of agents associated with hypertension: {bp agents assoc with hypertension:511::\"none\"}. Sodium (mmol/L)   Date Value   2021 136    BUN (mg/dL)   Date Value   2021 24 (H)    Glucose (mg/dL)   Date Value   2021 552 (H)      Potassium (mmol/L)   Date Value   2021 3.7    CREATININE (mg/dL)   Date Value   2021 1.8 (H)         Hyperlipidemia:  No new myalgias or GI upset on rosuvastatin (Crestor).      Lab Results   Component Value Date    CHOL 101 2021    TRIG 203 (H) 2021    HDL 41 2021    LDLCALC 19 2021     Lab Results   Component Value Date    ALT 14 2021    AST 13 (L) 2021            Review of Systems    Current Outpatient Medications   Medication Sig Dispense Refill    docusate sodium (COLACE) 100 MG capsule Take 1 capsule by mouth 2 times daily 60 capsule 0    Insulin Pen Needle ([employer] PEN NEEDLES 29G) 29G X 12MM MISC 1 each by Does not apply route daily 100 each 3    insulin glargine (LANTUS SOLOSTAR) 100 UNIT/ML injection pen Inject 15 Units into the skin nightly (Patient taking differently: Inject 30 Units into the skin nightly ) 5 pen 1    Blood Glucose Monitoring Suppl (BLOOD GLUCOSE SYSTEM NOEL) KIT Use as directed to test blood sugar three times a day  E11.9 1 kit 0    blood glucose test strips (ASCENSIA AUTODISC VI;ONE TOUCH ULTRA TEST VI) strip 1 each by In Vitro route 2 times daily As needed. 100 each 5    Lancets MISC Use as directed to test blood sugar three times a day E11.9 100 each 5    magnesium oxide (MAG-OX) 400 MG tablet Take 400 mg by mouth daily      [START ON 3/26/2021] clindamycin (CLEOCIN) 300 MG capsule Take 300 mg by mouth 3 times daily Pt completed this med      ferrous sulfate (IRON 325) 325 (65 Fe) MG tablet Take 325 mg by mouth daily (with breakfast)      rosuvastatin (CRESTOR) 40 MG tablet Take 40 mg by mouth every evening      oxyCODONE-acetaminophen (PERCOCET) 7.5-325 MG per tablet Take 1 tablet by mouth every 12 hours as needed for Pain.  pregabalin (LYRICA) 100 MG capsule Take 100 mg by mouth 2 times daily.  baclofen (LIORESAL) 10 MG tablet Take 10 mg by mouth 2 times daily as needed      gentamicin (GARAMYCIN) 0.1 % cream Apply topically daily wtih dressing changes.  1 Tube 3    clopidogrel (PLAVIX) 75 MG tablet TAKE 1 TABLET EVERY DAY 90 tablet 0    blood glucose monitor strips Use as directed to test blood sugar three times a day E11.9 100 strip 3    metoprolol succinate (TOPROL XL) 25 MG extended release tablet Take 1 tablet by mouth daily (Patient taking differently: Take 50 mg by mouth 2 times daily ) 90 tablet 1    furosemide (LASIX) 40 MG tablet Take 1 tablet by mouth daily 90 tablet 2    metFORMIN (GLUCOPHAGE) 1000 MG tablet Take 1 tablet by mouth 2 times daily (with meals) (Patient taking differently: Take 1,000 mg by mouth 2 times daily (with meals) Pt not taking anymore) 180 tablet 1    traZODone (DESYREL) 50 MG tablet 1 tab at bedtime  For sleep (Patient taking differently: Patient not taking anymore) 90 tablet 1    Blood Glucose Monitoring Suppl (CONTOUR NEXT ONE) KIT Test blood sugar twice a day 1 kit 1    aspirin 81 MG tablet Take 1 tablet by mouth daily 30 tablet 3     No current facility-administered medications for this visit. Social History     Socioeconomic History    Marital status:      Spouse name: Not on file    Number of children: Not on file    Years of education: Not on file    Highest education level: Not on file   Occupational History    Not on file   Social Needs    Financial resource strain: Not on file    Food insecurity     Worry: Not on file     Inability: Not on file    Transportation needs     Medical: Not on file     Non-medical: Not on file   Tobacco Use    Smoking status: Never Smoker    Smokeless tobacco: Never Used    Tobacco comment: 3/2/21   Substance and Sexual Activity    Alcohol use: No     Alcohol/week: 0.0 standard drinks    Drug use: No    Sexual activity: Not on file     Comment: 3/2/21 not asked   Lifestyle    Physical activity     Days per week: Not on file     Minutes per session: Not on file    Stress: Not on file   Relationships    Social connections     Talks on phone: Not on file     Gets together: Not on file     Attends Jainism service: Not on file     Active member of club or organization: Not on file     Attends meetings of clubs or organizations: Not on file     Relationship status: Not on file    Intimate partner violence     Fear of current or ex partner: Not on file     Emotionally abused: Not on file     Physically abused: Not on file     Forced sexual activity: Not on file   Other Topics Concern    Not on file   Social History Narrative    Not on file       Vitals:    03/17/21 1033   BP: 102/60   Pulse: 61   Temp: 97.1 °F (36.2 °C)   SpO2: 98%        Body mass index is 28.59 kg/m². Physical Exam:  Physical Exam    ASSESSMENT/PLAN:            No follow-ups on file. An electronic signature was used to authenticate this note.     --Linn Combs MD on 03/17/21 at 11:23 AM

## 2021-03-18 ENCOUNTER — APPOINTMENT (OUTPATIENT)
Dept: GENERAL RADIOLOGY | Age: 81
End: 2021-03-18
Payer: MEDICARE

## 2021-03-18 ENCOUNTER — APPOINTMENT (OUTPATIENT)
Dept: CT IMAGING | Age: 81
End: 2021-03-18
Payer: MEDICARE

## 2021-03-18 ENCOUNTER — HOSPITAL ENCOUNTER (OUTPATIENT)
Age: 81
Setting detail: OBSERVATION
Discharge: HOME | End: 2021-03-20
Attending: STUDENT IN AN ORGANIZED HEALTH CARE EDUCATION/TRAINING PROGRAM | Admitting: INTERNAL MEDICINE
Payer: MEDICARE

## 2021-03-18 DIAGNOSIS — K56.41 FECAL IMPACTION IN RECTUM (HCC): Primary | ICD-10-CM

## 2021-03-18 PROBLEM — K59.00 CONSTIPATION: Status: ACTIVE | Noted: 2021-03-18

## 2021-03-18 LAB
A/G RATIO: 1.1 (ref 1.1–2.2)
ALBUMIN SERPL-MCNC: 3.5 G/DL (ref 3.4–5)
ALP BLD-CCNC: 101 U/L (ref 40–129)
ALT SERPL-CCNC: 17 U/L (ref 10–40)
ANION GAP SERPL CALCULATED.3IONS-SCNC: 7 MMOL/L (ref 3–16)
AST SERPL-CCNC: 24 U/L (ref 15–37)
BACTERIA: ABNORMAL /HPF
BASOPHILS ABSOLUTE: 0 K/UL (ref 0–0.2)
BASOPHILS RELATIVE PERCENT: 0.3 %
BILIRUB SERPL-MCNC: 0.5 MG/DL (ref 0–1)
BILIRUBIN URINE: NEGATIVE
BLOOD, URINE: ABNORMAL
BUN BLDV-MCNC: 17 MG/DL (ref 7–20)
CALCIUM SERPL-MCNC: 9.5 MG/DL (ref 8.3–10.6)
CHLORIDE BLD-SCNC: 100 MMOL/L (ref 99–110)
CLARITY: CLEAR
CO2: 28 MMOL/L (ref 21–32)
COLOR: YELLOW
CREAT SERPL-MCNC: 1.3 MG/DL (ref 0.6–1.2)
EOSINOPHILS ABSOLUTE: 0.1 K/UL (ref 0–0.6)
EOSINOPHILS RELATIVE PERCENT: 1.3 %
EPITHELIAL CELLS, UA: ABNORMAL /HPF (ref 0–5)
GFR AFRICAN AMERICAN: 47
GFR NON-AFRICAN AMERICAN: 39
GLOBULIN: 3.3 G/DL
GLUCOSE BLD-MCNC: 166 MG/DL (ref 70–99)
GLUCOSE BLD-MCNC: 187 MG/DL (ref 70–99)
GLUCOSE URINE: >=1000 MG/DL
HCT VFR BLD CALC: 34 % (ref 36–48)
HEMOGLOBIN: 11 G/DL (ref 12–16)
KETONES, URINE: NEGATIVE MG/DL
LEUKOCYTE ESTERASE, URINE: NEGATIVE
LYMPHOCYTES ABSOLUTE: 1.4 K/UL (ref 1–5.1)
LYMPHOCYTES RELATIVE PERCENT: 27.3 %
MCH RBC QN AUTO: 28.1 PG (ref 26–34)
MCHC RBC AUTO-ENTMCNC: 32.2 G/DL (ref 31–36)
MCV RBC AUTO: 87.4 FL (ref 80–100)
MICROSCOPIC EXAMINATION: YES
MONOCYTES ABSOLUTE: 0.7 K/UL (ref 0–1.3)
MONOCYTES RELATIVE PERCENT: 13.2 %
NEUTROPHILS ABSOLUTE: 3 K/UL (ref 1.7–7.7)
NEUTROPHILS RELATIVE PERCENT: 57.9 %
NITRITE, URINE: NEGATIVE
OCCULT BLOOD SCREENING: NORMAL
PDW BLD-RTO: 13.6 % (ref 12.4–15.4)
PERFORMED ON: ABNORMAL
PH UA: 6 (ref 5–8)
PLATELET # BLD: 159 K/UL (ref 135–450)
PMV BLD AUTO: 9.5 FL (ref 5–10.5)
POTASSIUM SERPL-SCNC: 3.5 MMOL/L (ref 3.5–5.1)
PROTEIN UA: ABNORMAL MG/DL
RBC # BLD: 3.89 M/UL (ref 4–5.2)
RBC UA: ABNORMAL /HPF (ref 0–4)
RENAL EPITHELIAL, UA: ABNORMAL /HPF (ref 0–1)
SODIUM BLD-SCNC: 135 MMOL/L (ref 136–145)
SPECIFIC GRAVITY UA: 1.01 (ref 1–1.03)
TOTAL PROTEIN: 6.8 G/DL (ref 6.4–8.2)
URINE REFLEX TO CULTURE: ABNORMAL
URINE TYPE: ABNORMAL
UROBILINOGEN, URINE: 0.2 E.U./DL
WBC # BLD: 5.2 K/UL (ref 4–11)
WBC UA: ABNORMAL /HPF (ref 0–5)

## 2021-03-18 PROCEDURE — 96374 THER/PROPH/DIAG INJ IV PUSH: CPT

## 2021-03-18 PROCEDURE — 81001 URINALYSIS AUTO W/SCOPE: CPT

## 2021-03-18 PROCEDURE — 36415 COLL VENOUS BLD VENIPUNCTURE: CPT

## 2021-03-18 PROCEDURE — 74177 CT ABD & PELVIS W/CONTRAST: CPT

## 2021-03-18 PROCEDURE — 6360000002 HC RX W HCPCS: Performed by: NURSE PRACTITIONER

## 2021-03-18 PROCEDURE — 2580000003 HC RX 258: Performed by: PHYSICIAN ASSISTANT

## 2021-03-18 PROCEDURE — 6370000000 HC RX 637 (ALT 250 FOR IP): Performed by: NURSE PRACTITIONER

## 2021-03-18 PROCEDURE — 6370000000 HC RX 637 (ALT 250 FOR IP): Performed by: PHYSICIAN ASSISTANT

## 2021-03-18 PROCEDURE — 96372 THER/PROPH/DIAG INJ SC/IM: CPT

## 2021-03-18 PROCEDURE — 80053 COMPREHEN METABOLIC PANEL: CPT

## 2021-03-18 PROCEDURE — 6360000004 HC RX CONTRAST MEDICATION: Performed by: PHYSICIAN ASSISTANT

## 2021-03-18 PROCEDURE — G0378 HOSPITAL OBSERVATION PER HR: HCPCS

## 2021-03-18 PROCEDURE — 74018 RADEX ABDOMEN 1 VIEW: CPT

## 2021-03-18 PROCEDURE — 2580000003 HC RX 258: Performed by: NURSE PRACTITIONER

## 2021-03-18 PROCEDURE — 99285 EMERGENCY DEPT VISIT HI MDM: CPT

## 2021-03-18 PROCEDURE — G0328 FECAL BLOOD SCRN IMMUNOASSAY: HCPCS

## 2021-03-18 PROCEDURE — 85025 COMPLETE CBC W/AUTO DIFF WBC: CPT

## 2021-03-18 PROCEDURE — 6360000002 HC RX W HCPCS: Performed by: PHYSICIAN ASSISTANT

## 2021-03-18 RX ORDER — DEXTROSE MONOHYDRATE 50 MG/ML
100 INJECTION, SOLUTION INTRAVENOUS PRN
Status: DISCONTINUED | OUTPATIENT
Start: 2021-03-18 | End: 2021-03-20 | Stop reason: HOSPADM

## 2021-03-18 RX ORDER — METOPROLOL SUCCINATE 50 MG/1
50 TABLET, EXTENDED RELEASE ORAL 2 TIMES DAILY
Status: DISCONTINUED | OUTPATIENT
Start: 2021-03-18 | End: 2021-03-20 | Stop reason: HOSPADM

## 2021-03-18 RX ORDER — POLYETHYLENE GLYCOL 3350 17 G/17G
17 POWDER, FOR SOLUTION ORAL DAILY
Status: DISCONTINUED | OUTPATIENT
Start: 2021-03-18 | End: 2021-03-20 | Stop reason: HOSPADM

## 2021-03-18 RX ORDER — ROSUVASTATIN CALCIUM 10 MG/1
40 TABLET, COATED ORAL EVERY EVENING
Status: DISCONTINUED | OUTPATIENT
Start: 2021-03-18 | End: 2021-03-20 | Stop reason: HOSPADM

## 2021-03-18 RX ORDER — 0.9 % SODIUM CHLORIDE 0.9 %
1000 INTRAVENOUS SOLUTION INTRAVENOUS ONCE
Status: COMPLETED | OUTPATIENT
Start: 2021-03-18 | End: 2021-03-18

## 2021-03-18 RX ORDER — POLYETHYLENE GLYCOL 3350 17 G/17G
17 POWDER, FOR SOLUTION ORAL DAILY PRN
Status: DISCONTINUED | OUTPATIENT
Start: 2021-03-18 | End: 2021-03-20 | Stop reason: HOSPADM

## 2021-03-18 RX ORDER — SODIUM CHLORIDE 0.9 % (FLUSH) 0.9 %
10 SYRINGE (ML) INJECTION PRN
Status: DISCONTINUED | OUTPATIENT
Start: 2021-03-18 | End: 2021-03-20 | Stop reason: HOSPADM

## 2021-03-18 RX ORDER — BACLOFEN 10 MG/1
10 TABLET ORAL 2 TIMES DAILY PRN
Status: DISCONTINUED | OUTPATIENT
Start: 2021-03-18 | End: 2021-03-20 | Stop reason: HOSPADM

## 2021-03-18 RX ORDER — SODIUM CHLORIDE 0.9 % (FLUSH) 0.9 %
10 SYRINGE (ML) INJECTION EVERY 12 HOURS SCHEDULED
Status: DISCONTINUED | OUTPATIENT
Start: 2021-03-18 | End: 2021-03-20 | Stop reason: HOSPADM

## 2021-03-18 RX ORDER — POTASSIUM CHLORIDE 7.45 MG/ML
10 INJECTION INTRAVENOUS PRN
Status: DISCONTINUED | OUTPATIENT
Start: 2021-03-18 | End: 2021-03-18

## 2021-03-18 RX ORDER — ACETAMINOPHEN 325 MG/1
650 TABLET ORAL EVERY 6 HOURS PRN
Status: DISCONTINUED | OUTPATIENT
Start: 2021-03-18 | End: 2021-03-20 | Stop reason: HOSPADM

## 2021-03-18 RX ORDER — ACETAMINOPHEN 650 MG/1
650 SUPPOSITORY RECTAL EVERY 6 HOURS PRN
Status: DISCONTINUED | OUTPATIENT
Start: 2021-03-18 | End: 2021-03-20 | Stop reason: HOSPADM

## 2021-03-18 RX ORDER — ASPIRIN 81 MG/1
81 TABLET ORAL DAILY
Status: DISCONTINUED | OUTPATIENT
Start: 2021-03-18 | End: 2021-03-20 | Stop reason: HOSPADM

## 2021-03-18 RX ORDER — INSULIN GLARGINE 100 [IU]/ML
30 INJECTION, SOLUTION SUBCUTANEOUS NIGHTLY
Status: DISCONTINUED | OUTPATIENT
Start: 2021-03-18 | End: 2021-03-20 | Stop reason: HOSPADM

## 2021-03-18 RX ORDER — SODIUM PHOSPHATE,MONO-DIBASIC 19G-7G/118
1 ENEMA (ML) RECTAL ONCE
Status: COMPLETED | OUTPATIENT
Start: 2021-03-18 | End: 2021-03-18

## 2021-03-18 RX ORDER — DOCUSATE SODIUM 100 MG/1
100 CAPSULE, LIQUID FILLED ORAL 2 TIMES DAILY
Status: DISCONTINUED | OUTPATIENT
Start: 2021-03-18 | End: 2021-03-20 | Stop reason: HOSPADM

## 2021-03-18 RX ORDER — FERROUS SULFATE 325(65) MG
325 TABLET ORAL
Status: DISCONTINUED | OUTPATIENT
Start: 2021-03-19 | End: 2021-03-20 | Stop reason: HOSPADM

## 2021-03-18 RX ORDER — SENNA AND DOCUSATE SODIUM 50; 8.6 MG/1; MG/1
1 TABLET, FILM COATED ORAL 2 TIMES DAILY
Status: DISCONTINUED | OUTPATIENT
Start: 2021-03-18 | End: 2021-03-19

## 2021-03-18 RX ORDER — SODIUM CHLORIDE 9 MG/ML
INJECTION, SOLUTION INTRAVENOUS CONTINUOUS
Status: DISCONTINUED | OUTPATIENT
Start: 2021-03-18 | End: 2021-03-19

## 2021-03-18 RX ORDER — PROMETHAZINE HYDROCHLORIDE 25 MG/1
12.5 TABLET ORAL EVERY 6 HOURS PRN
Status: DISCONTINUED | OUTPATIENT
Start: 2021-03-18 | End: 2021-03-20 | Stop reason: HOSPADM

## 2021-03-18 RX ORDER — NICOTINE POLACRILEX 4 MG
15 LOZENGE BUCCAL PRN
Status: DISCONTINUED | OUTPATIENT
Start: 2021-03-18 | End: 2021-03-20 | Stop reason: HOSPADM

## 2021-03-18 RX ORDER — CLOPIDOGREL BISULFATE 75 MG/1
75 TABLET ORAL DAILY
Status: DISCONTINUED | OUTPATIENT
Start: 2021-03-18 | End: 2021-03-20 | Stop reason: HOSPADM

## 2021-03-18 RX ORDER — MORPHINE SULFATE 4 MG/ML
4 INJECTION, SOLUTION INTRAMUSCULAR; INTRAVENOUS ONCE
Status: COMPLETED | OUTPATIENT
Start: 2021-03-18 | End: 2021-03-18

## 2021-03-18 RX ORDER — DEXTROSE MONOHYDRATE 25 G/50ML
12.5 INJECTION, SOLUTION INTRAVENOUS PRN
Status: DISCONTINUED | OUTPATIENT
Start: 2021-03-18 | End: 2021-03-20 | Stop reason: HOSPADM

## 2021-03-18 RX ORDER — POTASSIUM CHLORIDE 20 MEQ/1
40 TABLET, EXTENDED RELEASE ORAL PRN
Status: DISCONTINUED | OUTPATIENT
Start: 2021-03-18 | End: 2021-03-18

## 2021-03-18 RX ORDER — ONDANSETRON 2 MG/ML
4 INJECTION INTRAMUSCULAR; INTRAVENOUS EVERY 6 HOURS PRN
Status: DISCONTINUED | OUTPATIENT
Start: 2021-03-18 | End: 2021-03-20 | Stop reason: HOSPADM

## 2021-03-18 RX ORDER — FUROSEMIDE 40 MG/1
40 TABLET ORAL DAILY
Status: DISCONTINUED | OUTPATIENT
Start: 2021-03-18 | End: 2021-03-20 | Stop reason: HOSPADM

## 2021-03-18 RX ADMIN — IOPAMIDOL 75 ML: 755 INJECTION, SOLUTION INTRAVENOUS at 16:56

## 2021-03-18 RX ADMIN — INSULIN GLARGINE 30 UNITS: 100 INJECTION, SOLUTION SUBCUTANEOUS at 23:08

## 2021-03-18 RX ADMIN — METOPROLOL SUCCINATE 50 MG: 50 TABLET, EXTENDED RELEASE ORAL at 23:02

## 2021-03-18 RX ADMIN — DOCUSATE SODIUM 100 MG: 100 CAPSULE, LIQUID FILLED ORAL at 23:02

## 2021-03-18 RX ADMIN — SODIUM CHLORIDE 1000 ML: 9 INJECTION, SOLUTION INTRAVENOUS at 17:58

## 2021-03-18 RX ADMIN — POLYETHYLENE GLYCOL 3350 17 G: 17 POWDER, FOR SOLUTION ORAL at 23:01

## 2021-03-18 RX ADMIN — INSULIN LISPRO 1 UNITS: 100 INJECTION, SOLUTION INTRAVENOUS; SUBCUTANEOUS at 23:08

## 2021-03-18 RX ADMIN — DOCUSATE SODIUM AND SENNOSIDES 1 TABLET: 8.6; 5 TABLET, FILM COATED ORAL at 23:08

## 2021-03-18 RX ADMIN — ROSUVASTATIN CALCIUM 40 MG: 10 TABLET, FILM COATED ORAL at 23:02

## 2021-03-18 RX ADMIN — CLOPIDOGREL BISULFATE 75 MG: 75 TABLET ORAL at 23:02

## 2021-03-18 RX ADMIN — ASPIRIN 81 MG: 81 TABLET, COATED ORAL at 23:01

## 2021-03-18 RX ADMIN — SODIUM CHLORIDE: 9 INJECTION, SOLUTION INTRAVENOUS at 23:03

## 2021-03-18 RX ADMIN — Medication 10 ML: at 23:03

## 2021-03-18 RX ADMIN — MAGNESIUM CITRATE 296 ML: 1.75 LIQUID ORAL at 17:58

## 2021-03-18 RX ADMIN — BISACODYL 5 MG: 5 TABLET, COATED ORAL at 23:02

## 2021-03-18 RX ADMIN — ENOXAPARIN SODIUM 30 MG: 30 INJECTION SUBCUTANEOUS at 23:01

## 2021-03-18 RX ADMIN — FUROSEMIDE 40 MG: 40 TABLET ORAL at 23:02

## 2021-03-18 RX ADMIN — SODIUM PHOSPHATE 1 ENEMA: 7; 19 ENEMA RECTAL at 15:03

## 2021-03-18 RX ADMIN — MORPHINE SULFATE 4 MG: 4 INJECTION, SOLUTION INTRAMUSCULAR; INTRAVENOUS at 15:02

## 2021-03-18 ASSESSMENT — PAIN DESCRIPTION - ORIENTATION: ORIENTATION: RIGHT;LEFT

## 2021-03-18 ASSESSMENT — ENCOUNTER SYMPTOMS
VOMITING: 0
NAUSEA: 0
COUGH: 0
CONSTIPATION: 1
SHORTNESS OF BREATH: 0
CHEST TIGHTNESS: 0
BACK PAIN: 0
ANAL BLEEDING: 1
ABDOMINAL PAIN: 0

## 2021-03-18 ASSESSMENT — PAIN DESCRIPTION - PAIN TYPE: TYPE: ACUTE PAIN

## 2021-03-18 ASSESSMENT — PAIN DESCRIPTION - DESCRIPTORS: DESCRIPTORS: PRESSURE

## 2021-03-18 ASSESSMENT — PAIN SCALES - GENERAL: PAINLEVEL_OUTOF10: 5

## 2021-03-18 NOTE — ED PROVIDER NOTES
**ADVANCED PRACTICE PROVIDER, I HAVE EVALUATED THIS [facility]  ED  EMERGENCY DEPARTMENT ENCOUNTER      Pt Name: Yoseph Nichols  DPO:[id number]  [place] 1940  Date of evaluation: 3/18/2021  Provider: DEVON Lomeli      Chief Complaint:    Chief Complaint   Patient presents with    Constipation     patient states that they haven't had a bowel movement in 6 days. Patient denies abdominal pain, nausea, vomiting. Nursing Notes, Past Medical Hx, Past Surgical Hx, Social Hx, Allergies, and Family Hx were all reviewed and agreed with or any disagreements were addressed in the HPI.    HPI:  (Location, Duration, Timing, Severity, Quality, Assoc Sx, Context, Modifying factors)  This is a  79 y.o. female presenting to the emergency department with a history of diabetes, bilateral heel ulcers which are currently being treated by wound management. The patient presented to the emergency department with a chief complaint of constipation. The patient states she has not been able to have a bowel movement in 6 days. She has a history of hemorrhoids and has noticed BRBPR recently, and thinks her hemorrhoids are obstructing her bowel movements. She was seen by her primary care yesterday who recommended MiraLAX in preparation H. Patient denies pain at this time. Patient denies fever, chills, nausea, vomiting, cp, SOB, change in urination.      PastMedical/Surgical History:      Diagnosis Date    Ankle swelling     Arthritis     Breast cancer ([facility] 6/15/1999    CAD (coronary artery disease) 10/13/2012    Cancer ([facility]     breast    Generalized headaches     HTN (hypertension) 9/18/2015    Hyperlipidemia     OA (osteoarthritis) of knee 6/16/2015    Type II or unspecified type diabetes mellitus without mention of complication, not stated as uncontrolled          Procedure Laterality Date   [address] [place]    [place]    HYSTERECTOMY      partial       Medications:  Previous Medications    ASPIRIN 81 MG TABLET    Take 1 tablet by mouth daily    BACLOFEN (LIORESAL) 10 MG TABLET    Take 10 mg by mouth 2 times daily as needed    BLOOD GLUCOSE MONITOR STRIPS    Use as directed to test blood sugar three times a day E11.9    BLOOD GLUCOSE MONITORING SUPPL (BLOOD GLUCOSE SYSTEM NOEL) KIT    Use as directed to test blood sugar three times a day  E11.9    BLOOD GLUCOSE MONITORING SUPPL (CONTOUR NEXT ONE) KIT    Test blood sugar twice a day    BLOOD GLUCOSE TEST STRIPS (ASCENSIA AUTODISC VI;ONE TOUCH ULTRA TEST VI) STRIP    1 each by In Vitro route 2 times daily As needed. CLINDAMYCIN (CLEOCIN) 300 MG CAPSULE    Take 300 mg by mouth 3 times daily Pt completed this med    CLOPIDOGREL (PLAVIX) 75 MG TABLET    TAKE 1 TABLET EVERY DAY    DOCUSATE SODIUM (COLACE) 100 MG CAPSULE    Take 1 capsule by mouth 2 times daily    FERROUS SULFATE (IRON 325) 325 (65 FE) MG TABLET    Take 325 mg by mouth daily (with breakfast)    FUROSEMIDE (LASIX) 40 MG TABLET    Take 1 tablet by mouth daily    GENTAMICIN (GARAMYCIN) 0.1 % CREAM    Apply topically daily wtih dressing changes. INSULIN GLARGINE (LANTUS SOLOSTAR) 100 UNIT/ML INJECTION PEN    Inject 15 Units into the skin nightly    INSULIN PEN NEEDLE ([employer] PEN NEEDLES 29G) 29G X 12MM MISC    1 each by Does not apply route daily    LANCETS MISC    Use as directed to test blood sugar three times a day E11.9    MAGNESIUM OXIDE (MAG-OX) 400 MG TABLET    Take 400 mg by mouth daily    METFORMIN (GLUCOPHAGE) 1000 MG TABLET    Take 1 tablet by mouth 2 times daily (with meals)    METOPROLOL SUCCINATE (TOPROL XL) 25 MG EXTENDED RELEASE TABLET    Take 1 tablet by mouth daily    OXYCODONE-ACETAMINOPHEN (PERCOCET) 7.5-325 MG PER TABLET    Take 1 tablet by mouth every 12 hours as needed for Pain. PREGABALIN (LYRICA) 100 MG CAPSULE    Take 100 mg by mouth 2 times daily. ROSUVASTATIN (CRESTOR) 40 MG TABLET    Take 40 mg by mouth every evening    TRAZODONE (DESYREL) 50 MG TABLET    1 tab at bedtime  For sleep         Review of Systems:  Review of Systems   Constitutional: Negative for chills and fever. HENT: Negative for congestion. Eyes: Negative for visual disturbance. Respiratory: Negative for cough, chest tightness and shortness of breath. Cardiovascular: Negative for chest pain and palpitations. Gastrointestinal: Positive for anal bleeding and constipation. Negative for abdominal pain, nausea and vomiting. Genitourinary: Negative for dysuria, frequency and urgency. Musculoskeletal: Negative for back pain. Skin: Positive for wound. Negative for rash. Neurological: Negative for dizziness, weakness and headaches. Positives and Pertinent negatives as per HPI. Except as noted above in the ROS, problem specific ROS was completed and is negative. Physical Exam:  Physical Exam  Vitals signs and nursing note reviewed. Constitutional:       Appearance: Normal appearance. She is not diaphoretic. HENT:      Head: Normocephalic and atraumatic. Nose: Nose normal.   Eyes:      General:         Right eye: No discharge. Left eye: No discharge. Neck:      Musculoskeletal: Normal range of motion and neck supple. Cardiovascular:      Rate and Rhythm: Normal rate and regular rhythm. Pulses: Normal pulses. Heart sounds: Normal heart sounds. No murmur. No friction rub. No gallop. Pulmonary:      Effort: Pulmonary effort is normal. No respiratory distress. Breath sounds: Normal breath sounds. No stridor. No wheezing, rhonchi or rales. Abdominal:      General: Abdomen is flat. There is distension. Palpations: Abdomen is soft. There is no mass. Tenderness: There is no abdominal tenderness. There is no right CVA tenderness, left CVA tenderness or guarding.    Genitourinary:     Comments: Rectal exam: Multiple external nonbleeding nonstrangulated hemorrhoids by. Rectal impaction noted upon digital rectal exam.  Musculoskeletal: Normal range of motion. Skin:     General: Skin is warm and dry. Coloration: Skin is not pale. Comments: Ulceration to bilateral medial heels. Pulses 2+ bilaterally   Neurological:      Mental Status: She is alert and oriented to person, place, and time.    Psychiatric:         Mood and Affect: Mood normal.         Behavior: Behavior normal.         MEDICAL DECISION MAKING    Vitals:    Vitals:    03/18/21 1233 03/18/21 1417 03/18/21 1533   BP: (!) 157/83 (!) 117/59 (!) 141/59   Pulse: 79 65 65   Resp: 16 16 16   Temp: 98.3 °F (36.8 °C)     TempSrc: Oral     SpO2: 97% 98% 96%   Weight: 151 lb (68.5 kg)     Height: 5' 2\" (1.575 m)         LABS:  Labs Reviewed   CBC WITH AUTO DIFFERENTIAL - Abnormal; Notable for the following components:       Result Value    RBC 3.89 (*)     Hemoglobin 11.0 (*)     Hematocrit 34.0 (*)     All other components within normal limits    Narrative:     Performed at:  [place] [address] [employer]   Phone [phone]   COMPREHENSIVE METABOLIC PANEL - Abnormal; Notable for the following components:    Sodium 135 (*)     Glucose 187 (*)     CREATININE 1.3 (*)     GFR Non- 39 (*)     GFR  47 (*)     All other components within normal limits    Narrative:     Performed at:  [place] [address] [employer]   Phone [phone]   URINE RT REFLEX TO CULTURE - Abnormal; Notable for the following components:    Glucose, Ur >=1000 (*)     Blood, Urine SMALL (*)     Protein, UA TRACE (*)     All other components within normal limits    Narrative:     Performed at:  [place] [address] [employer]   Phone [phone]   MICROSCOPIC URINALYSIS - Abnormal; Notable for the following components:    Bacteria, UA 1+ (*)     All other components within normal limits    Narrative:     Performed at:  [address], [address] [place] [address]   Phone [phone]   BLOOD OCCULT STOOL SCREEN #1    Narrative:     ORDER#: [id number]                          ORDERED BY: Debbie Rosa  SOURCE: Stool                              COLLECTED:  03/18/21 14:15  ANTIBIOTICS AT LUCERO.:                      RECEIVED :  03/18/21 14:22  Performed at:  [address], [address] [place] [address] [employer]   Phone  of labs reviewed and werenegative at this time or not returned at the time of this note. RADIOLOGY:   Non-plain film images such as CT, Ultrasound and MRI are read by the radiologist. DEVON Milan have directly visualized the radiologic plain film image(s) with the below findings:        Interpretation per the Radiologist below, if available at the time of this note:    XR ABDOMEN (KUB) (SINGLE AP VIEW)   Final Result   Indeterminate intestinal gas pattern secondary to paucity of small bowel gas. CT ABDOMEN PELVIS W IV CONTRAST Additional Contrast? None    (Results Pending)        Xr Abdomen (kub) (single Ap View)    Result Date: 3/18/2021  EXAMINATION: ONE SUPINE XRAY VIEW(S) OF THE ABDOMEN 3/18/2021 1:17 pm COMPARISON: None. HISTORY: ORDERING SYSTEM PROVIDED HISTORY: constipation TECHNOLOGIST PROVIDED HISTORY: Reason for exam:->constipation Reason shows nonbleeding: constipation Acuity: Acute Type of Exam: Initial FINDINGS: There is an overall paucity of small bowel gas. There is moderate to large gas and stool throughout the colon. No suspicious calcifications are identified. Indeterminate intestinal gas pattern secondary to paucity of small bowel gas.           MEDICAL DECISION MAKING / ED COURSE:        Patient was given:  Medications   sodium phosphate (FLEET) enema 1 enema (1 enema Rectal Given 3/18/21 1503)   morphine (PF) injection 4 mg (4 mg Intravenous Given 3/18/21 1502)       Patient presented with a history of constipation and bleeding hemorrhoids. Her triage vitals are unremarkable. Physical exam is grossly unremarkable, she does have BLE open ulceration of her medial calcaneals. She is routinely following with wound care, there hasn't been a change in her wounds therefore we discuss treatment of her wounds in the ED today is not indicated, however we agree to change the dressings as they have not been changed today. X-ray of the abdomen shows indeterminate intestinal gas secondary to paucity of bowel gas. Digital rectal exam reveals fecal impaction and nonbleeding external hemorrhoids. Guac was negative. Patient did not tolerate attempt at digital disimpaction. She was given enema while in the emergency department, patient still unable to have bowel movement. CT abdomen with contrast ordered for further evaluation. I spoke with Dr. Sammy Arroyo who agreed to follow up with remainder of ED course and CT results. The patient tolerated their visit well. I evaluated the patient. The physician was available for consultation as needed. The patient and / or the family were informed of the results of any tests, a time was given to answer questions, a plan was proposed and they agreed with plan. CLINICAL IMPRESSION:  1. Fecal impaction in rectum (HCC)        DISPOSITION        PATIENT REFERRED TO:  No follow-up provider specified.     DISCHARGE MEDICATIONS:  New Prescriptions    No medications on file       DISCONTINUED MEDICATIONS:  Discontinued Medications    No medications on file              (Please note the MDM and HPI sections of this note were completed with a voice recognition program.  Efforts were made to edit the dictations but occasionally words are mis-transcribed.)    Electronically signed, Jaspal Campo,           Jaspal Campo  03/18/21 [address] [place] [address]

## 2021-03-18 NOTE — H&P
Hospital Medicine History & Physical      PCP: Luda Aguayo MD    Date of Admission: 3/18/2021    Date of Service: Pt seen/examined on 3/18/2021 and Admitted to observation with expected LOS less than two midnights due to medical therapy. Chief Complaint:  constipation    History Of Present Illness:     79 y.o. female, with PMH of HTN, HLD, CAD and DM, who presented to [facility] with constipation. History obtained from the patient and review of EMR. The patient stated she has not had a bowel movement in 6 days. She stated that her rectum is beginning to hurt from \"straining\" trying to have a BM. The patient stated she does have a history of hemorrhoids and has noticed some bright red blood per rectum recently. She stated she is concerned that her hemorrhoids are obstructing her from having a bowel movement. The patient stated she was seen by her PCP yesterday and was recommended to use MiraLAX and Preparation H. She stated she only used 1 dose of MiraLAX and decided to go to the emergency room. In the emergency room a abdominal x-ray was obtained that revealed indeterminate intestinal gas pattern secondary to paucity of small bowel gas. CT abdomen pelvis was also obtained that revealed no acute abnormality in the abdomen or pelvis. Large amount of stool throughout the colon indicates underlying constipation. A digital rectal exam was done that revealed fecal impaction and nonbleeding external hemorrhoids. The patient did not tolerate an attempt at a digital disimpaction. She was given an enema while in the emergency room, but still unable to have a BM. The patient also received magnesium citrate. She will be admitted for further evaluation and treatment. The patient denied any other associated symptoms as well as any aggravating and/or alleviating factors. At the time of this assessment, the patient was resting comfortably in bed.  She currently denies any chest pain, back pain, abdominal pain, shortness of breath, numbness, tingling, N/V/C/D, fever and/or chills. Past Medical History:          Diagnosis Date    Ankle swelling     Arthritis     Breast cancer ([facility] 6/15/1999    CAD (coronary artery disease) 10/13/2012    Cancer ([facility]     breast    Generalized headaches     HTN (hypertension) 9/18/2015    Hyperlipidemia     OA (osteoarthritis) of knee 6/16/2015    Type II or unspecified type diabetes mellitus without mention of complication, not stated as uncontrolled      Past Surgical History:          Procedure Laterality Date    BACK SURGERY  1999, 1986    BREAST SURGERY  1999    breast biopys    CARPAL [place]EL [place] 01[place]13/2017    [place]    HYSTERECTOMY      partial     Medications Prior to Admission:      Prior to Admission medications    Medication Sig Start Date End Date Taking? Authorizing Provider   docusate sodium (COLACE) 100 MG capsule Take 1 capsule by mouth 2 times daily 3/14/21 4/13/21  DEVON Laura   Insulin Pen Needle ([employer] PEN NEEDLES 29G) 29G X 12MM MISC 1 each by Does not apply route daily 3/14/21   DEVON Laura   insulin glargine (LANTUS SOLOSTAR) 100 UNIT/ML injection pen Inject 15 Units into the skin nightly  Patient taking differently: Inject 30 Units into the skin nightly  3/9/21   Evaristo Mcghee MD   Blood Glucose Monitoring Suppl (BLOOD GLUCOSE SYSTEM NOEL) KIT Use as directed to test blood sugar three times a day  E11.9 3/9/21   Evaristo Mcghee MD   blood glucose test strips (ASCENSIA AUTODISC VI;ONE TOUCH ULTRA TEST VI) strip 1 each by In Vitro route 2 times daily As needed.  3/9/21   Evaristo Mcghee MD   Lancets MISC Use as directed to test blood sugar three times a day E11.9 3/9/21   Evaristo Mcghee MD   magnesium oxide (MAG-OX) 400 MG tablet Take 400 mg by mouth daily    Historical Provider, MD   clindamycin (CLEOCIN) 300 MG capsule Take 300 mg by mouth 3 times daily Pt completed this med 3/26/21   Historical Provider, MD ferrous sulfate (IRON 325) 325 (65 Fe) MG tablet Take 325 mg by mouth daily (with breakfast)    Historical Provider, MD   rosuvastatin (CRESTOR) 40 MG tablet Take 40 mg by mouth every evening    Historical Provider, MD   oxyCODONE-acetaminophen (PERCOCET) 7.5-325 MG per tablet Take 1 tablet by mouth every 12 hours as needed for Pain. Historical Provider, MD   pregabalin (LYRICA) 100 MG capsule Take 100 mg by mouth 2 times daily. Historical Provider, MD   baclofen (LIORESAL) 10 MG tablet Take 10 mg by mouth 2 times daily as needed    Historical Provider, MD   gentamicin (GARAMYCIN) 0.1 % cream Apply topically daily wtih dressing changes.  3/2/21   Gael Burgos DPM   clopidogrel (PLAVIX) 75 MG tablet TAKE 1 TABLET EVERY DAY 5/28/19   Ellen Harada, APRN - CNP   blood glucose monitor strips Use as directed to test blood sugar three times a day E11.9 8/30/18   Luda Aguayo MD   metoprolol succinate (TOPROL XL) 25 MG extended release tablet Take 1 tablet by mouth daily  Patient taking differently: Take 50 mg by mouth 2 times daily  8/30/18   Luda Aguayo MD   furosemide (LASIX) 40 MG tablet Take 1 tablet by mouth daily 8/30/18   Luda Aguayo MD   metFORMIN (GLUCOPHAGE) 1000 MG tablet Take 1 tablet by mouth 2 times daily (with meals)  Patient taking differently: Take 1,000 mg by mouth 2 times daily (with meals) Pt not taking anymore 8/30/18   Luda Aguayo MD   traZODone (DESYREL) 50 MG tablet 1 tab at bedtime  For sleep  Patient taking differently: Patient not taking anymore 8/30/18   Luda Aguayo MD   Blood Glucose Monitoring Suppl (CONTOUR NEXT ONE) KIT Test blood sugar twice a day 8/3/17   Luda Aguayo MD   aspirin 81 MG tablet Take 1 tablet by mouth daily 8/11/16   Yrn Arriaga MD     Allergies:  Alendronate sodium, Influenza vaccines, Pneumococcal vaccines, Rofecoxib, Vioxx, and Sulfa antibiotics    Social History:      The patient currently lives at home    TOBACCO:   reports that she has never smoked. She has never used smokeless tobacco.  ETOH:   reports no history of alcohol use. E-Cigarettes/Vaping Use     Questions Responses    E-Cigarette/Vaping Use Never User    Start Date     Passive Exposure     Quit Date     Counseling Given     Comments         Family History:      Reviewed in detail. Positive as follows:        Problem Relation Age of Onset    Heart Disease Brother     Hearing Loss Mother     High Blood Pressure Mother      REVIEW OF SYSTEMS:   Pertinent positives as noted in the HPI. All other systems reviewed and negative. PHYSICAL EXAM PERFORMED:    /65   Pulse 65   Temp 98.3 °F (36.8 °C) (Oral)   Resp 16   Ht 5' 2\" (1.575 m)   Wt 151 lb (68.5 kg)   SpO2 96%   BMI 27.62 kg/m²     General appearance:  Pleasant, elderly female in no apparent distress, appears stated age and cooperative. HEENT: Pupils equal, round, and reactive to light. Extra ocular muscles intact. Conjunctivae/corneas clear. Neck: Supple, with full range of motion. No jugular venous distention. Trachea midline. Respiratory:  Normal respiratory effort. Clear to auscultation, bilaterally without Rales/Wheezes/Rhonchi. Cardiovascular:  Regular rate and rhythm with normal S1/S2 without murmurs, rubs or gallops. Abdomen: Soft, non-tender, non-distended with normal bowel sounds. Musculoskeletal:  No clubbing, cyanosis or edema bilaterally. Full range of motion without deformity. Skin: Skin color, texture, turgor normal. Bilateral heel ulcers  Neurologic:  Neurovascularly intact.  Cranial nerves: II-XII intact, grossly non-focal.  Psychiatric:  Alert and oriented, thought content appropriate, normal insight  Capillary Refill: Brisk,< 3 seconds   Peripheral Pulses: +2 palpable, equal bilaterally     Labs:     Recent Labs     03/18/21  1231   WBC 5.2   HGB 11.0*   HCT 34.0*        Recent Labs     03/18/21  1231   *   K 3.5      CO2 28   BUN 17   CREATININE 1.3*   CALCIUM 9.5 Recent Labs     03/18/21  1231   AST 24   ALT 17   BILITOT 0.5   ALKPHOS 101     Urinalysis:      Lab Results   Component Value Date    NITRU Negative 03/18/2021    WBCUA 3-5 03/18/2021    BACTERIA 1+ 03/18/2021    RBCUA 3-4 03/18/2021    BLOODU SMALL 03/18/2021    SPECGRAV 1.015 03/18/2021    GLUCOSEU >=1000 03/18/2021     Radiology:     CXR: I have reviewed the CXR with the following interpretation: N/A    EKG:  I have reviewed the EKG with the following interpretation: N/A    CT ABDOMEN PELVIS W IV CONTRAST Additional Contrast? None   Final Result   1. No acute abnormality in the abdomen or pelvis. 2. Large amount of stool throughout the colon indicates underlying   constipation. 3. Incidental cholelithiasis. 4. Severe degenerative change contributing to spinal canal stenosis at L4-5. XR ABDOMEN (KUB) (SINGLE AP VIEW)   Final Result   Indeterminate intestinal gas pattern secondary to paucity of small bowel gas. ASSESSMENT:    Active Hospital Problems    Diagnosis Date Noted    Constipation [K59.00] 03/18/2021    Mixed hyperlipidemia [E78.2] 08/11/2016    HTN (hypertension) [I10] 09/18/2015    CAD (coronary artery disease) [I25.10] 10/13/2012    [facility] [E11.9] 05/29/2010     PLAN:    Constipation  -XR abdomen revealed: indeterminate intestinal gas pattern secondary to paucity of small bowel gas  -CT abdomen pelvis revealed: No acute abnormality in the abdomen or pelvis.   Large amount of stool throughout the colon indicates underlying constipation.  -lactulose enema given in ED  -1 L NS given in ED  -magnesium citrate given in ED  -fleet enema given in ED  -continue docusate sodium BID  -glycolax daily  -dulcolax daily  -senokot bid  -MIVF    Essential HTN in setting of known CAD  -continue metoprolol  -continue ASA and plavix daily    DM2, uncontrolled  -  -hemglobin a1c 13.4 on 3/9/2021  -hold home metformin  -continue lantus  -mdssi  -poct ac/hs -hypoglycemia protocol  -carb control diet    HLD  -continue rosuvastatin    Chronic normocytic anemia, 11.0/34.0 on admission  -no s/s of bleeding at this time  -cbc in am  -continue ferrous sulfate    DVT Prophylaxis: Lovenox    Diet: No diet orders on file     Code Status: Prior    PT/OT Eval Status: no indication for need at this time    Dispo - 1-2 days pending clinical improvement     Courtney Martinez, APRN - CNP    Thank you Samson Antonio MD for the opportunity to be involved in this patient's care.  If you have any questions or concerns please feel free to contact me at [phone].  ----------------------------------Anticipated Dr. Vandana hernández---------------------------------------

## 2021-03-18 NOTE — ED NOTES
Patient identified as a positive fall risk on the ED triage fall screening. Patient placed in fall precautions which includes:  yellow fall risk bracelet on wrist, \"Be Safe\" sign placed on patient's door, and bed alarm placed under patient/alarm turned on. Patient instructed on importance of not getting out of bed or ambulating without assistance for safety.           Alphonso Gonzalez RN  03/18/21 [id number]

## 2021-03-19 LAB
ANION GAP SERPL CALCULATED.3IONS-SCNC: 6 MMOL/L (ref 3–16)
BASOPHILS ABSOLUTE: 0 K/UL (ref 0–0.2)
BASOPHILS RELATIVE PERCENT: 0.3 %
BUN BLDV-MCNC: 13 MG/DL (ref 7–20)
CALCIUM SERPL-MCNC: 9.3 MG/DL (ref 8.3–10.6)
CHLORIDE BLD-SCNC: 106 MMOL/L (ref 99–110)
CO2: 29 MMOL/L (ref 21–32)
CREAT SERPL-MCNC: 1.1 MG/DL (ref 0.6–1.2)
EOSINOPHILS ABSOLUTE: 0.1 K/UL (ref 0–0.6)
EOSINOPHILS RELATIVE PERCENT: 1.1 %
GFR AFRICAN AMERICAN: 58
GFR NON-AFRICAN AMERICAN: 48
GLUCOSE BLD-MCNC: 119 MG/DL (ref 70–99)
GLUCOSE BLD-MCNC: 140 MG/DL (ref 70–99)
GLUCOSE BLD-MCNC: 153 MG/DL (ref 70–99)
GLUCOSE BLD-MCNC: 193 MG/DL (ref 70–99)
GLUCOSE BLD-MCNC: 194 MG/DL (ref 70–99)
GLUCOSE BLD-MCNC: 96 MG/DL (ref 70–99)
HCT VFR BLD CALC: 30.1 % (ref 36–48)
HEMOGLOBIN: 9.8 G/DL (ref 12–16)
LYMPHOCYTES ABSOLUTE: 1.4 K/UL (ref 1–5.1)
LYMPHOCYTES RELATIVE PERCENT: 28.2 %
MAGNESIUM: 3.3 MG/DL (ref 1.8–2.4)
MCH RBC QN AUTO: 28.4 PG (ref 26–34)
MCHC RBC AUTO-ENTMCNC: 32.4 G/DL (ref 31–36)
MCV RBC AUTO: 87.7 FL (ref 80–100)
MONOCYTES ABSOLUTE: 0.7 K/UL (ref 0–1.3)
MONOCYTES RELATIVE PERCENT: 13.4 %
NEUTROPHILS ABSOLUTE: 2.8 K/UL (ref 1.7–7.7)
NEUTROPHILS RELATIVE PERCENT: 57 %
PDW BLD-RTO: 13.5 % (ref 12.4–15.4)
PERFORMED ON: ABNORMAL
PERFORMED ON: NORMAL
PLATELET # BLD: 140 K/UL (ref 135–450)
PMV BLD AUTO: 9.6 FL (ref 5–10.5)
POTASSIUM REFLEX MAGNESIUM: 2.9 MMOL/L (ref 3.5–5.1)
RBC # BLD: 3.43 M/UL (ref 4–5.2)
SODIUM BLD-SCNC: 141 MMOL/L (ref 136–145)
WBC # BLD: 4.9 K/UL (ref 4–11)

## 2021-03-19 PROCEDURE — 36415 COLL VENOUS BLD VENIPUNCTURE: CPT

## 2021-03-19 PROCEDURE — 83735 ASSAY OF MAGNESIUM: CPT

## 2021-03-19 PROCEDURE — 85025 COMPLETE CBC W/AUTO DIFF WBC: CPT

## 2021-03-19 PROCEDURE — 6370000000 HC RX 637 (ALT 250 FOR IP): Performed by: INTERNAL MEDICINE

## 2021-03-19 PROCEDURE — 96372 THER/PROPH/DIAG INJ SC/IM: CPT

## 2021-03-19 PROCEDURE — 2580000003 HC RX 258: Performed by: NURSE PRACTITIONER

## 2021-03-19 PROCEDURE — 6370000000 HC RX 637 (ALT 250 FOR IP): Performed by: NURSE PRACTITIONER

## 2021-03-19 PROCEDURE — G0378 HOSPITAL OBSERVATION PER HR: HCPCS

## 2021-03-19 PROCEDURE — 6360000002 HC RX W HCPCS: Performed by: NURSE PRACTITIONER

## 2021-03-19 PROCEDURE — 80048 BASIC METABOLIC PNL TOTAL CA: CPT

## 2021-03-19 RX ORDER — POTASSIUM CHLORIDE 20 MEQ/1
40 TABLET, EXTENDED RELEASE ORAL ONCE
Status: COMPLETED | OUTPATIENT
Start: 2021-03-19 | End: 2021-03-19

## 2021-03-19 RX ORDER — GENTAMICIN SULFATE 1 MG/G
OINTMENT TOPICAL DAILY
Status: DISCONTINUED | OUTPATIENT
Start: 2021-03-19 | End: 2021-03-20 | Stop reason: HOSPADM

## 2021-03-19 RX ORDER — OXYCODONE AND ACETAMINOPHEN 7.5; 325 MG/1; MG/1
1 TABLET ORAL EVERY 8 HOURS PRN
Status: DISCONTINUED | OUTPATIENT
Start: 2021-03-19 | End: 2021-03-20 | Stop reason: HOSPADM

## 2021-03-19 RX ADMIN — FUROSEMIDE 40 MG: 40 TABLET ORAL at 10:44

## 2021-03-19 RX ADMIN — BACLOFEN 10 MG: 10 TABLET ORAL at 21:14

## 2021-03-19 RX ADMIN — Medication 10 ML: at 21:14

## 2021-03-19 RX ADMIN — OXYCODONE HYDROCHLORIDE AND ACETAMINOPHEN 1 TABLET: 7.5; 325 TABLET ORAL at 15:29

## 2021-03-19 RX ADMIN — INSULIN LISPRO 2 UNITS: 100 INJECTION, SOLUTION INTRAVENOUS; SUBCUTANEOUS at 10:49

## 2021-03-19 RX ADMIN — ASPIRIN 81 MG: 81 TABLET, COATED ORAL at 10:44

## 2021-03-19 RX ADMIN — INSULIN LISPRO 2 UNITS: 100 INJECTION, SOLUTION INTRAVENOUS; SUBCUTANEOUS at 18:09

## 2021-03-19 RX ADMIN — DOCUSATE SODIUM 100 MG: 100 CAPSULE, LIQUID FILLED ORAL at 21:14

## 2021-03-19 RX ADMIN — ROSUVASTATIN CALCIUM 40 MG: 10 TABLET, FILM COATED ORAL at 18:09

## 2021-03-19 RX ADMIN — Medication 10 ML: at 10:45

## 2021-03-19 RX ADMIN — GENTAMICIN SULFATE: 1 OINTMENT TOPICAL at 10:44

## 2021-03-19 RX ADMIN — POTASSIUM CHLORIDE 40 MEQ: 1500 TABLET, EXTENDED RELEASE ORAL at 10:58

## 2021-03-19 RX ADMIN — FERROUS SULFATE TAB 325 MG (65 MG ELEMENTAL FE) 325 MG: 325 (65 FE) TAB at 10:44

## 2021-03-19 RX ADMIN — ENOXAPARIN SODIUM 30 MG: 30 INJECTION SUBCUTANEOUS at 10:44

## 2021-03-19 RX ADMIN — METOPROLOL SUCCINATE 50 MG: 50 TABLET, EXTENDED RELEASE ORAL at 21:14

## 2021-03-19 RX ADMIN — INSULIN GLARGINE 30 UNITS: 100 INJECTION, SOLUTION SUBCUTANEOUS at 21:15

## 2021-03-19 RX ADMIN — INSULIN LISPRO 1 UNITS: 100 INJECTION, SOLUTION INTRAVENOUS; SUBCUTANEOUS at 21:15

## 2021-03-19 RX ADMIN — CLOPIDOGREL BISULFATE 75 MG: 75 TABLET ORAL at 10:44

## 2021-03-19 RX ADMIN — SODIUM CHLORIDE: 9 INJECTION, SOLUTION INTRAVENOUS at 13:28

## 2021-03-19 ASSESSMENT — PAIN SCALES - GENERAL: PAINLEVEL_OUTOF10: 8

## 2021-03-19 NOTE — ACP (ADVANCE CARE PLANNING)
Advance Care Planning Note     Name: Emmanuelle Lynch  YOB: 1940  MRN: [id number]  Admission Date: 3/18/2021 1930     Date of Discussion: 3/18/2021     Active Diagnoses:     Constipation, diabetes, hypertension, CAD     These active diagnoses are of sufficient risk that focused discussion on advance care planning is indicated in order to allow the patient to thoughtfully consider personal goals of care; and, if situations arise that prevent the ability to personally give input, to ensure appropriate representation of their personal desires for different levels and agressiveness of care.      Discussion:     Persons present and participating in discussion: YvonneYudelka Zafar     Patient's decisional capacity or surrogate:  Pt with capacity     Discussion in summary: The patient stated she does not have a living will and/or advanced directive. When discussing CODE STATUS the patient stated \"do whatever it takes to keep me going\". Chest compressions, defibrillation, intubation and medications were discussed at length with the patient. The patient has been made a full code to reflect her wishes. Code status: FULL CODE     Time Spent:      Total time spent face-to-face in education and discussion: 25 minutes.     Yudelka Fernandez, CNP

## 2021-03-19 NOTE — CONSULTS
[facility] Nurse  Consult Note       NAME:  Remington Sheridan  MEDICAL RECORD NUMBER:  [id number]  AGE: 79 y.o. GENDER: female  : 1940  TODAY'S DATE:  3/19/2021    Subjective: They started out as blisters in [place]. Dr. Wick So removed the blister. Reason for WOCN Evaluation and Assessment: R Heel - diabetic ulcer; L Heel - deroofed, deflated dry blister      Remington Sheridan is a 79 y.o. female referred by:   [] Physician  [x] Nursing  [] Other:     Wound Identification:  Wound Type: diabetic and pressure  Contributing Factors: diabetes, chronic pressure and decreased mobility    Wound History: 79 y.o. female, with PMH of HTN, HLD, CAD and DM, who presented to [facility] with constipation. History obtained from the patient and review of EMR. The patient stated she has not had a bowel movement in 6 days. She stated that her rectum is beginning to hurt from \"straining\" trying to have a BM. The patient stated she does have a history of hemorrhoids and has noticed some bright red blood per rectum recently. She stated she is concerned that her hemorrhoids are obstructing her from having a bowel movement. The patient stated she was seen by her PCP yesterday and was recommended to use MiraLAX and Preparation H. She stated she only used 1 dose of MiraLAX and decided to go to the emergency room. In the emergency room a abdominal x-ray was obtained that revealed indeterminate intestinal gas pattern secondary to paucity of small bowel gas. CT abdomen pelvis was also obtained that revealed no acute abnormality in the abdomen or pelvis. Large amount of stool throughout the colon indicates underlying constipation. A digital rectal exam was done that revealed fecal impaction and nonbleeding external hemorrhoids. The patient did not tolerate an attempt at a digital disimpaction. She was given an enema while in the emergency room, but still unable to have a BM.   The patient also received magnesium citrate. She will be admitted for further evaluation and treatment. The patient denied any other associated symptoms as well as any aggravating and/or alleviating factors. At the time of this assessment, the patient was resting comfortably in bed. She currently denies any chest pain, back pain, abdominal pain, shortness of breath, numbness, tingling, N/V/C/D, fever and/or chills.    Current Wound Care Treatment: R Heel - Gentamycin ointment  L Heel - hydrogel    Patient Goal of Care:  [x] Wound Healing  [] Odor Control  [] Palliative Care  [] Pain Control   [] Other:         PAST MEDICAL HISTORY        Diagnosis Date    Ankle swelling     Arthritis     Breast cancer ([facility] 6/15/1999    CAD (coronary artery disease) 10/13/2012    Cancer ([facility]     breast    Generalized headaches     HTN (hypertension) 9/18/2015    Hyperlipidemia     OA (osteoarthritis) of knee 6/16/2015    Type II or unspecified type diabetes mellitus without mention of complication, not stated as uncontrolled        PAST SURGICAL HISTORY    Past Surgical History:   Procedure Laterality Date    BACK SURGERY  1999, 1986    BREAST SURGERY  1999    breast biopys    [place]    HYSTERECTOMY      partial       FAMILY HISTORY    Family History   Problem Relation Age of Onset    Heart Disease Brother     Hearing Loss Mother     High Blood Pressure Mother        SOCIAL HISTORY    Social History     Tobacco Use    Smoking status: Never Smoker    Smokeless tobacco: Never Used    Tobacco comment: 3/2/21   Substance Use Topics    Alcohol use: No     Alcohol/week: 0.0 standard drinks    Drug use: No       ALLERGIES    Allergies   Allergen Reactions    Alendronate Sodium Anaphylaxis    Influenza Vaccines     Pneumococcal Vaccines Other (See Comments)    Rofecoxib Swelling    Vioxx     Sulfa Antibiotics Rash       MEDICATIONS    No current facility-administered medications on file prior to encounter. Current Outpatient Medications on File Prior to Encounter   Medication Sig Dispense Refill    docusate sodium (COLACE) 100 MG capsule Take 1 capsule by mouth 2 times daily 60 capsule 0    Insulin Pen Needle ([employer] PEN NEEDLES 29G) 29G X 12MM MISC 1 each by Does not apply route daily 100 each 3    insulin glargine (LANTUS SOLOSTAR) 100 UNIT/ML injection pen Inject 15 Units into the skin nightly (Patient taking differently: Inject 30 Units into the skin nightly ) 5 pen 1    Blood Glucose Monitoring Suppl (BLOOD GLUCOSE SYSTEM NOEL) KIT Use as directed to test blood sugar three times a day  E11.9 1 kit 0    blood glucose test strips (ASCENSIA AUTODISC VI;ONE TOUCH ULTRA TEST VI) strip 1 each by In Vitro route 2 times daily As needed. 100 each 5    Lancets MISC Use as directed to test blood sugar three times a day E11.9 100 each 5    magnesium oxide (MAG-OX) 400 MG tablet Take 400 mg by mouth daily      [START ON 3/26/2021] clindamycin (CLEOCIN) 300 MG capsule Take 300 mg by mouth 3 times daily Pt completed this med      ferrous sulfate (IRON 325) 325 (65 Fe) MG tablet Take 325 mg by mouth daily (with breakfast)      rosuvastatin (CRESTOR) 40 MG tablet Take 40 mg by mouth every evening      oxyCODONE-acetaminophen (PERCOCET) 7.5-325 MG per tablet Take 1 tablet by mouth every 12 hours as needed for Pain.  pregabalin (LYRICA) 100 MG capsule Take 100 mg by mouth 2 times daily.  baclofen (LIORESAL) 10 MG tablet Take 10 mg by mouth 2 times daily as needed      gentamicin (GARAMYCIN) 0.1 % cream Apply topically daily [facility] dressing changes.  1 Tube 3    clopidogrel (PLAVIX) 75 MG tablet TAKE 1 TABLET EVERY DAY 90 tablet 0    blood glucose monitor strips Use as directed to test blood sugar three times a day E11.9 100 strip 3    metoprolol succinate (TOPROL XL) 25 MG extended release tablet Take 1 tablet by mouth daily (Patient taking differently: Take 50 mg by mouth 2 times daily ) 90 tablet 1    furosemide (LASIX) 40 MG tablet Take 1 tablet by mouth daily 90 tablet 2    metFORMIN (GLUCOPHAGE) 1000 MG tablet Take 1 tablet by mouth 2 times daily (with meals) (Patient taking differently: Take 1,000 mg by mouth 2 times daily (with meals) Pt not taking anymore) 180 tablet 1    traZODone (DESYREL) 50 MG tablet 1 tab at bedtime  For sleep (Patient taking differently: Patient not taking anymore) 90 tablet 1    Blood Glucose Monitoring Suppl (CONTOUR NEXT ONE) KIT Test blood sugar twice a day 1 kit 1    aspirin 81 MG tablet Take 1 tablet by mouth daily 30 tablet 3       Objective: A/O; lying in bed; both feet wrapped in dry dressing.      BP (!) 147/68   Pulse 72   Temp 98.2 °F (36.8 °C) (Oral)   Resp 16   Ht 5' 1\" (1.549 m)   Wt 154 lb 12.2 oz (70.2 kg)   SpO2 99%   BMI 29.24 kg/m²     LABS:  WBC:    Lab Results   Component Value Date    WBC 4.9 03/19/2021     H/H:    Lab Results   Component Value Date    HGB 9.8 03/19/2021    HCT 30.1 03/19/2021     PTT:    Lab Results   Component Value Date    APTT >248.0 09/18/2015   [APTT}  PT/INR:    Lab Results   Component Value Date    PROTIME 12.2 09/17/2015    INR 1.12 09/17/2015     HgBA1c:    Lab Results   Component Value Date    LABA1C 13.4 03/09/2021       Assessment: L Heel - deroofed, deflated dried blister, tender  R Heel - 80% red and 20% yellow wound bed; Dr. Beth Bucio debrided on 3/16/21   Carlos Risk Score: Carlos Scale Score: 18    Patient Active Problem List   Diagnosis Code    Diabetes mellitus ([facility] E11.9    Pure hypercholesterolemia E78.00    Bilateral carpal tunnel syndrome G56.03    Lumbar radiculopathy M54.16    Diabetic peripheral neuropathy (HCC) E11.42    CAD (coronary artery disease) I25.10    Pain medication agreement signed Z02.89    OA (osteoarthritis) of knee M17.10    Chondrocalcinosis M11.20    Other specified diabetes mellitus with diabetic neuropathy E13.40    Chest pain on breathing R07.1    Abnormal ECG R94.31    HTN (hypertension) I10    Chest pain R07.9    Abnormal stress test R94.39    Single vessel coronary artery disease I25.10    S/P primary angioplasty with coronary stent Z95.5    Palpitations R00.2    Mixed hyperlipidemia E78.2    Hip pain, bilateral M25.551, M25.552    Hyperkalemia E87.5    Anemia D64.9    Constipation K59.00       Measurements:  Wound 03/02/21 #2, Right Medial Heel, Diabetic Foot Ulcer, Carbajal 1, onset 11/1/20 (Active)   Wound Image   03/19/21 0945   Wound Etiology Diabetic 03/19/21 0945   Dressing Status Old drainage noted;New dressing applied 03/19/21 0945   Wound Cleansed Cleansed with saline 03/19/21 0945   Dressing/Treatment Pharmaceutical agent (see MAR); Dry dressing 03/19/21 0945   Offloading for Diabetic Foot Ulcers Offloading boot 03/19/21 0945   Dressing Change Due 03/20/21 03/19/21 0945   Wound Length (cm) 3 cm 03/19/21 0945   Wound Width (cm) 4 cm 03/19/21 0945   Wound Depth (cm) 0.2 cm 03/19/21 0945   Wound Surface Area (cm^2) 12 cm^2 03/19/21 0945   Change in Wound Size % (l*w) -400 03/19/21 0945   Wound Volume (cm^3) 2.4 cm^3 03/19/21 0945   Wound Healing % -400 03/19/21 0945   Post-Procedure Length (cm) 2.5 cm 03/16/21 1136   Post-Procedure Width (cm) 4 cm 03/16/21 1136   Post-Procedure Depth (cm) 0.2 cm 03/16/21 1136   Post-Procedure Surface Area (cm^2) 10 cm^2 03/16/21 1136   Post-Procedure Volume (cm^3) 2 cm^3 03/16/21 1136   Distance Tunneling (cm) 0 cm 03/09/21 0821   Undermining Maxium Distance (cm) 0 03/09/21 0821   Wound Assessment Pink/red; Other (Comment) 03/19/21 0945   Drainage Amount Scant 03/19/21 0945   Drainage Description Serosanguinous 03/19/21 0945   Odor None 03/19/21 0945   Zahida-wound Assessment Dry/flaky 03/19/21 0945   Margins Attached edges 03/19/21 0945   Number of days: 17    R Heel:         Wound 03/19/21 Heel Left (Active)   Wound Image   03/19/21 0945   Wound Etiology Diabetic 03/19/21 0945   Dressing Status Dry; Intact; New dressing applied 03/19/21 0945   Wound Cleansed Cleansed with saline 03/19/21 0945   Dressing/Treatment Hydrating gel;Dry dressing 03/19/21 0945   Offloading for Diabetic Foot Ulcers Offloading boot 03/19/21 0945   Dressing Change Due 03/20/21 03/19/21 0945   Wound Length (cm) 5 cm 03/19/21 0945   Wound Width (cm) 5 cm 03/19/21 0945   Wound Depth (cm) 0 cm 03/19/21 0945   Wound Surface Area (cm^2) 25 cm^2 03/19/21 0945   Wound Volume (cm^3) 0 cm^3 03/19/21 0945   Wound Assessment Dry;Pink/red 03/19/21 0945   Drainage Amount None 03/19/21 0945   Odor None 03/19/21 0945   Zahida-wound Assessment Dry/flaky 03/19/21 0945   Margins Attached edges; Defined edges 03/19/21 0945   Number of days: 0   L Heel:        Response to treatment:  Well tolerated by patient. Pain Assessment:  Severity:  3 / 10  Quality of pain: sharp, tender  Wound Pain Timing/Severity: intermittent  Premedicated: No    Plan   Plan of Care: Wound 03/02/21 #2, Right Medial Heel, Diabetic Foot Ulcer, Carbajal 1, onset 11/1/20-Dressing/Treatment: Pharmaceutical agent (see MAR), Dry dressing(Gentamycin)  Wound 03/19/21 Heel Left-Dressing/Treatment: Hydrating gel, Dry dressing   Recommendation: R Heel - clean with NS; pat dry; apply Gentamycin ointment; dry dressing; roll gauze; change daily  L Heel - clean with NS; pat dry; apply Hydrogel; dry dressing; roll gauze; change daily  Float heels of bed with pillows or use off-loading boots  Call Wound Care for deterioration [phone]    Specialty Bed Required : No   [] Low Air Loss   [] Pressure Redistribution  [] Fluid Immersion  [] Bariatric  [] Total Pressure Relief  [] Other:     Current Diet: DIET CARB CONTROL;   Dietician consult:  No    Discharge Plan:  Placement for patient upon discharge: home with support    Patient appropriate for Outpatient [address]: Yes has follow up appointment schedule 3/23/21 with Dr. Lois Pearson    Referrals:  [x]   [] Home Health Care  [] Supplies  [] Other    Patient/Caregiver Teaching:  Level of patient/caregiver understanding able to:   [x] Indicates understanding       [] Needs reinforcement  [] Unsuccessful      [] Verbal Understanding  [] Demonstrated understanding       [] No evidence of learning  [] Refused teaching         [] N/A       Electronically signed by Angel Zendejas RN, Maxx Feliz on 3/19/2021 at 9:50 AM

## 2021-03-19 NOTE — PROGRESS NOTES
Paged Dr Flory Patel with following: Critical Lab: K 2.9  Also, FYI pt has had several rounds of diarrhea since admit. 1st BM large & formed, now just watery. Holding am stool softeners for now.     Electronically signed by Jaime Rogers RN on 3/19/2021 at 7:22 AM

## 2021-03-19 NOTE — PROGRESS NOTES
Pt admitted to C3, room 343. Admission assessment as charted. A/O x 4. VSS. SB to the [facility]. Continent of B/B. Pt with extra large brown soft stool. Nonskid footwear on. Bed in low position, wheels locked, SR x 2, call light and bedside table within reach.

## 2021-03-19 NOTE — ED PROVIDER NOTES
I independently examined and evaluated [employer]. In brief, Rosa Mora is a 79 y.o. female with a past medical history of diabetes, coronary artery disease, hypertension, hyperlipidemia, and anemia, who presents to the ED complaining of constipation. Patient reports 2 weeks without a bowel movement. She does also have a history of bleeding hemorrhoids. She had been previously on Percocet, this has been stopped. Patient has been using Preparation H for hemorrhoids. At home she has tried MiraLAX, mag citrate, stool softeners, and OTC remedies. Patient denies any abdominal pain but does report feeling full. She has had poor appetite due to this. She denies any nausea or vomiting. Focused exam revealed   PHYSICAL EXAM  BP (!) 146/72   Pulse 77   Temp 98.4 °F (36.9 °C) (Oral)   Resp 16   Ht 5' 1\" (1.549 m)   Wt 154 lb 12.2 oz (70.2 kg)   SpO2 98%   BMI 29.24 kg/m²    GENERAL APPEARANCE: Awake and alert. Cooperative. no distress. HENT: Normocephalic. Atraumatic. Mucous membranes are moist  NECK: Supple. Full range of motion of the neck without stiffness or pain. EYES: PERRL. EOM's grossly intact. HEART/CHEST: RRR. No murmurs. Chest wall is not tender to palpation. LUNGS: Respirations unlabored. CTAB. Good air exchange. Speaking comfortably in full sentences. ABDOMEN: No tenderness. Soft. Non-distended. No masses. No organomegaly. No guarding or rebound. Rectal exam performed by midlevel provider. No bleeding hemorrhoids noted. JAVY did appreciate feeling of fecal impaction but was unable to disimpact due to patient intolerance  MUSCULOSKELETAL: No extremity edema. Compartments soft. No deformity. No tenderness in the extremities. All extremities neurovascularly intact. SKIN: Warm and dry. No acute rashes. NEUROLOGICAL: Alert and oriented. No gross facial drooping. Strength 5/5, sensation intact. PSYCHIATRIC: Normal mood and affect.       ED course / MDM:   Overall patient in no acute distress, presenting for constipation. Patient is reportedly not had a bowel movement for 2 weeks. Tried multiple over-the-counter remedies without success. physical exam remarkable for rectal exam completed by JAVY showed evidence of stool impaction, however patient did not tolerate manual disimpaction. Differential diagnosis includes but is not limited to: Constipation, medication side effect, fecal impaction, small bowel obstruction, colonic stricture, malignancy    CT ABDOMEN PELVIS W IV CONTRAST Additional Contrast? None   Final Result   1. No acute abnormality in the abdomen or pelvis. 2. Large amount of stool throughout the colon indicates underlying   constipation. 3. Incidental cholelithiasis. 4. Severe degenerative change contributing to spinal canal stenosis at L4-5. XR ABDOMEN (KUB) (SINGLE AP VIEW)   Final Result   Indeterminate intestinal gas pattern secondary to paucity of small bowel gas. ED Course as of Mar 18 2327   Thu Mar 18, 2021   1606 KUB: IMPRESSION:  Indeterminate intestinal gas pattern secondary to paucity of small bowel gas. [ER]   1834 Urinalysis shows small blood, no evidence of infection.    [ER]   1843 Stool occult negative. [ER]   1843 Mild hyponatremia and hyperglycemia, no other electrolyte abnormalities. [ER]   1844 Improved creatinine compared to 9 days ago. But is still elevated. [ER]   1844 Liver function testing unremarkable. [ER]   2325 CT abd/pelvis: IMPRESSION:  1. No acute abnormality in the abdomen or pelvis. 2. Large amount of stool throughout the colon indicates underlying constipation. 3. Incidental cholelithiasis. 4. Severe degenerative change contributing to spinal canal stenosis at L4-5.    [ER]      ED Course User Index  [ER] Matilda Gomez MD     Patient received Fleet enema and magnesium citrate in the emergency department. No bowel movement.   Given that imaging shows large colonic stool burden that has been unresponsive to extensive outpatient management per caretaker, do feel that admission for further monitoring and constipation management is in the patient's best interest.  Furthermore, further work-up as to why the patient has become so constipated would be appropriate. Discussed the patient with hospital team.    CLINICAL IMPRESSION  1. Fecal impaction in rectum (HCC)        Blood pressure (!) 146/72, pulse 77, temperature 98.4 °F (36.9 °C), temperature source Oral, resp. rate 16, height 5' 1\" (1.549 m), weight 154 lb 12.2 oz (70.2 kg), SpO2 98 %, not currently breastfeeding. Linda Brand was admitted in stable condition. All diagnostic, treatment, and disposition decisions were made by myself in conjunction with the advanced practice provider. For all further details of the patient's emergency department visit, please see the advanced practice provider's documentation. Comment: Please note this report has been produced using speech recognition software and may contain errors related to that system including errors in grammar, punctuation, and spelling, as well as words and phrases that may be inappropriate. If there are any questions or concerns please feel free to contact the dictating provider for clarification.         Celena Robles MD  03/18/21 8098

## 2021-03-19 NOTE — PROGRESS NOTES
Comprehensive Nutrition Assessment    Type and Reason for Visit:  Wound, Positive Nutrition Screen, Initial(Diabetic ulcer)    Nutrition Recommendations/Plan:   1. Encourage PO intake and continue on carb control diet  2. RD to add Ensure high protein BID to promote protein intake for wound healing. 3. Continue to monitor FSBS levels and adjust insulin as needed. 4. Monitor bowel movements and bowel medications as needed. 5. Monitor nutrition adequacy, pertinent labs, bowel habits, wt changes, and clinical progress    Nutrition Assessment:  Pt admitted with constipation. Diabetic ulcers on right and left heels. Pt laying in bed at time of visit today, reported a good appetite and PO intake before coming to the hospital. Reported consuming cereal, wynne, toast, eggs and fruit at home. PT consumed 2 pieces of wynne this morning and didn't like the lunch today. No N/V, no chewing or swallowing problems. RD educated patient on carbohydrate counting and pt was receptive. RD to add Ensure high protein BID to promote protein and calorie intake for wound healing    Malnutrition Assessment:  Malnutrition Status:  No malnutrition    Context:  Acute Illness     Findings of the 6 clinical characteristics of malnutrition:  Energy Intake:  No significant decrease in energy intake  Weight Loss:  No significant weight loss       Estimated Daily Nutrient Needs:  Energy (kcal):  9006-5221 kcals/day; Weight Used for Energy Requirements:  Ideal     Protein (g):  60-72 g; Weight Used for Protein Requirements:  Ideal(1.25-1.5 g/kg)        Fluid (ml/day):   ; Method Used for Fluid Requirements:  1 ml/kcal      Nutrition Related Findings:  No BM in 6 days upon admission. Pt has had several rounds of diarrhea since admit. 1st BM large & formed, now just watery. BG  x 24 hours. A1c on 3/9 was 13.4. Pt reports current weight is usual body weight, no previous weight loss.       Wounds:  Diabetic Ulcer(1 diabetic ulcer on right food and 1 on left.)       Current Nutrition Therapies:    DIET CARB CONTROL; Anthropometric Measures:  · Height: 5' 1\" (154.9 cm)  · Current Body Weight: 154 lb (69.9 kg)   · Ideal Body Weight: 105 lbs; % Ideal Body Weight     · BMI: 29.1  · BMI Categories: Overweight (BMI 25.0-29. 9)       Nutrition Diagnosis:   · Increased nutrient needs related to increase demand for energy/nutrients as evidenced by wounds    · Altered nutrition-related lab values related to endocrine dysfuntion as evidenced by lab values, other (comment)(Hemoglobin A1C of 13.4)    Nutrition Interventions:   Food and/or Nutrient Delivery:  Continue Current Diet, Start Oral Nutrition Supplement  Nutrition Education/Counseling:  Education declined   Coordination of Nutrition Care:  Continue to monitor while inpatient    Goals:  Consume at least 50-75% of 3 meals per day and at least 75% of ONS BID.        Nutrition Monitoring and Evaluation:   Behavioral-Environmental Outcomes:  None Identified   Food/Nutrient Intake Outcomes:  Supplement Intake, Food and Nutrient Intake  Physical Signs/Symptoms Outcomes:  Diarrhea, Constipation     Discharge Planning:    Continue current diet     Electronically signed by Delroy Frazier MS, RD, LD on 3/19/21 at 2:20 PM EDT    Contact: Office: [phone]; [address]: [address]

## 2021-03-19 NOTE — CARE COORDINATION
CASE MANAGEMENT INITIAL ASSESSMENT      Reviewed chart and completed assessment with:patient   Explained Case Management role/services. Primary contact information:Anitra Cordova [phone]    Health Care Decision Maker :     Lynne Arzola      Can this person be reached and be able to respond quickly, such as within a few minutes or hours? Yes      Admit date/status:3/18/21  Diagnosis:constipation   Is this a Readmission?:  Yes      Samia Baca required for SNF: Yes       3 night stay required: No    Living arrangements, Adls, care needs, prior to admission:staying with friend Leigh Ibrahim apartment 1 step entry    [address] at home:  Walker_x_Cane__RTS__ BSC__Shower Chair__  02__ HHN__ CPAP__  BiPap__  Hospital Bed__ W/C_x borrowed__ Other__________    Services in the home and/or outpatient, prior to [address] at [place] [address] wound clinic    Dialysis Facility (if applicable)   · Name:  · Address:  · Dialysis Schedule:  · Phone:  · Fax:    PT/OT recs:none    Hospital Exemption Notification (HEN):needed for SNF    Barriers to discharge:none    Plan/comments:spoke with patient. Reported from out of town. Staying with a friend in her apartment. Has wound on foot. Following at [place] [address] wound center. Will continue to follow for needs. Ambulatory with walker.       [facility]OC on chart for MD signature

## 2021-03-19 NOTE — PROGRESS NOTES
Hospitalist Progress Note      PCP: Evaristo Mcghee MD    Date of Admission: 3/18/2021    Chief Complaint: constipation    Hospital Course:   79 y.o. female, with PMH of HTN, HLD, CAD and DM, who presented to [facility] with constipation. History obtained from the patient and review of EMR. The patient stated she has not had a bowel movement in 6 days. She stated that her rectum is beginning to hurt from \"straining\" trying to have a BM. The patient stated she does have a history of hemorrhoids and has noticed some bright red blood per rectum recently. She stated she is concerned that her hemorrhoids are obstructing her from having a bowel movement. The patient stated she was seen by her PCP yesterday and was recommended to use MiraLAX and Preparation H. She stated she only used 1 dose of MiraLAX and decided to go to the emergency room. In the emergency room a abdominal x-ray was obtained that revealed indeterminate intestinal gas pattern secondary to paucity of small bowel gas. CT abdomen pelvis was also obtained that revealed no acute abnormality in the abdomen or pelvis. Large amount of stool throughout the colon indicates underlying constipation. A digital rectal exam was done that revealed fecal impaction and nonbleeding external hemorrhoids. The patient did not tolerate an attempt at a digital disimpaction. She was given an enema while in the emergency room, but still unable to have a BM. The patient also received magnesium citrate. She will be admitted for further evaluation and treatment. The patient denied any other associated symptoms as well as any aggravating and/or alleviating factors. At the time of this assessment, the patient was resting comfortably in bed. She currently denies any chest pain, back pain, abdominal pain, shortness of breath, numbness, tingling, N/V/C/D, fever and/or chills. Subjective: much improved following bowel regimen.  Home narcotic restarted with scheduled bowel regimen. No new complaints. Medications:  Reviewed    Infusion Medications    sodium chloride 75 mL/hr at 03/19/21 1328    dextrose       Scheduled Medications    gentamicin   Topical Daily    lactulose enema   Rectal Once    aspirin  81 mg Oral Daily    clopidogrel  75 mg Oral Daily    docusate sodium  100 mg Oral BID    ferrous sulfate  325 mg Oral Daily with breakfast    furosemide  40 mg Oral Daily    insulin glargine  30 Units Subcutaneous Nightly    metoprolol succinate  50 mg Oral BID    rosuvastatin  40 mg Oral QPM    sodium chloride flush  10 mL Intravenous 2 times per day    enoxaparin  30 mg Subcutaneous Daily    polyethylene glycol  17 g Oral Daily    bisacodyl  5 mg Oral Daily    sennosides-docusate sodium  1 tablet Oral BID    insulin lispro  0-12 Units Subcutaneous TID WC    insulin lispro  0-6 Units Subcutaneous Nightly     PRN Meds: oxyCODONE-acetaminophen, baclofen, sodium chloride flush, promethazine **OR** ondansetron, polyethylene glycol, acetaminophen **OR** acetaminophen, glucose, dextrose, glucagon (rDNA), dextrose      Intake/Output Summary (Last 24 hours) at 3/19/2021 1435  Last data filed at 3/19/2021 1329  Gross per 24 hour   Intake 1081.22 ml   Output    Net 1081.22 ml       Physical Exam Performed:    BP (!) 103/54   Pulse 55   Temp 98.5 °F (36.9 °C) (Oral)   Resp 16   Ht 5' 1\" (1.549 m)   Wt 154 lb 12.2 oz (70.2 kg)   SpO2 100%   BMI 29.24 kg/m²     General appearance:  Pleasant, elderly female in no apparent distress, appears stated age and cooperative. HEENT: Pupils equal, round, and reactive to light. Extra ocular muscles intact. Conjunctivae/corneas clear. Neck: Supple, with full range of motion. No jugular venous distention. Trachea midline. Respiratory:  Normal respiratory effort. Clear to auscultation, bilaterally without Rales/Wheezes/Rhonchi.   Cardiovascular:  Regular rate and rhythm with normal S1/S2 without murmurs, rubs or gallops. Abdomen: Soft, non-tender, non-distended with normal bowel sounds. Musculoskeletal:  No clubbing, cyanosis or edema bilaterally. Full range of motion without deformity. Skin: Skin color, texture, turgor normal. Bilateral heel ulcers  Neurologic:  Neurovascularly intact. Cranial nerves: II-XII intact, grossly non-focal.  Psychiatric:  Alert and oriented, thought content appropriate, normal insight  Capillary Refill: Brisk,< 3 seconds   Peripheral Pulses: +2 palpable, equal bilaterally       Labs:   Recent Labs     03/18/21  1231 03/19/21  0545   WBC 5.2 4.9   HGB 11.0* 9.8*   HCT 34.0* 30.1*    140     Recent Labs     03/18/21  1231 03/19/21  0545   * 141   K 3.5 2.9*    106   CO2 28 29   BUN 17 13   CREATININE 1.3* 1.1   CALCIUM 9.5 9.3     Recent Labs     03/18/21  1231   AST 24   ALT 17   BILITOT 0.5   ALKPHOS 101     No results for input(s): INR in the last 72 hours. No results for input(s): Sammy Basilioer in the last 72 hours. Urinalysis:      Lab Results   Component Value Date    NITRU Negative 03/18/2021    WBCUA 3-5 03/18/2021    BACTERIA 1+ 03/18/2021    RBCUA 3-4 03/18/2021    BLOODU SMALL 03/18/2021    SPECGRAV 1.015 03/18/2021    GLUCOSEU >=1000 03/18/2021       Radiology:  CT ABDOMEN PELVIS W IV CONTRAST Additional Contrast? None   Final Result   1. No acute abnormality in the abdomen or pelvis. 2. Large amount of stool throughout the colon indicates underlying   constipation. 3. Incidental cholelithiasis. 4. Severe degenerative change contributing to spinal canal stenosis at L4-5. XR ABDOMEN (KUB) (SINGLE AP VIEW)   Final Result   Indeterminate intestinal gas pattern secondary to paucity of small bowel gas.                  Assessment/Plan:    Active Hospital Problems    Diagnosis    Constipation [K59.00]    Mixed hyperlipidemia [E78.2]    HTN (hypertension) [I10]    CAD (coronary artery disease) [I25.10]    Diabetes mellitus ([facility] [address] [E11.9] constipation  - opioid induced  - continue bowel regimen  - restarted home opioid    DMII  - poorly controlled  - holding home oral meds  - lantus with SSI ordered    Diabetic foot ulcer  - wound care consulted  - continue home pain control    CAD  - no active chest pain  - continue home ASA, plavix, metoprolol, statin    HTN  - well controlled  - continue home metoprolol    HLD  - continue home statin    Chronic anemia  - stable  - continue home Fe  - continue to monitor    DVT Prophylaxis: lovenox  Diet: DIET CARB CONTROL;  Code Status: Full Code    PT/OT Eval Status: not ordered    Dispo - likely home tomorrow    Phil Martinez MD

## 2021-03-19 NOTE — PLAN OF CARE
Nutrition Problem #1: Increased nutrient needs  Intervention: Food and/or Nutrient Delivery: Continue Current Diet, Start Oral Nutrition Supplement  Nutritional Goals: Consume at least 50-75% of 3 meals per day and at least 75% of ONS BID.

## 2021-03-20 VITALS
BODY MASS INDEX: 29.22 KG/M2 | TEMPERATURE: 98.5 F | OXYGEN SATURATION: 99 % | WEIGHT: 154.76 LBS | DIASTOLIC BLOOD PRESSURE: 61 MMHG | SYSTOLIC BLOOD PRESSURE: 116 MMHG | HEART RATE: 57 BPM | RESPIRATION RATE: 16 BRPM | HEIGHT: 61 IN

## 2021-03-20 LAB
GLUCOSE BLD-MCNC: 309 MG/DL (ref 70–99)
GLUCOSE BLD-MCNC: 81 MG/DL (ref 70–99)
GLUCOSE BLD-MCNC: 91 MG/DL (ref 70–99)
PERFORMED ON: ABNORMAL
PERFORMED ON: NORMAL
PERFORMED ON: NORMAL

## 2021-03-20 PROCEDURE — 96372 THER/PROPH/DIAG INJ SC/IM: CPT

## 2021-03-20 PROCEDURE — 2580000003 HC RX 258: Performed by: NURSE PRACTITIONER

## 2021-03-20 PROCEDURE — G0378 HOSPITAL OBSERVATION PER HR: HCPCS

## 2021-03-20 PROCEDURE — 6370000000 HC RX 637 (ALT 250 FOR IP): Performed by: INTERNAL MEDICINE

## 2021-03-20 PROCEDURE — 6360000002 HC RX W HCPCS: Performed by: NURSE PRACTITIONER

## 2021-03-20 PROCEDURE — 6370000000 HC RX 637 (ALT 250 FOR IP): Performed by: NURSE PRACTITIONER

## 2021-03-20 RX ORDER — POLYETHYLENE GLYCOL 3350 17 G/17G
17 POWDER, FOR SOLUTION ORAL DAILY PRN
Qty: 527 G | Refills: 0 | Status: SHIPPED | OUTPATIENT
Start: 2021-03-20

## 2021-03-20 RX ADMIN — BISACODYL 5 MG: 5 TABLET, COATED ORAL at 09:50

## 2021-03-20 RX ADMIN — ENOXAPARIN SODIUM 30 MG: 30 INJECTION SUBCUTANEOUS at 09:50

## 2021-03-20 RX ADMIN — METOPROLOL SUCCINATE 50 MG: 50 TABLET, EXTENDED RELEASE ORAL at 09:50

## 2021-03-20 RX ADMIN — POLYETHYLENE GLYCOL 3350 17 G: 17 POWDER, FOR SOLUTION ORAL at 09:50

## 2021-03-20 RX ADMIN — Medication 10 ML: at 09:50

## 2021-03-20 RX ADMIN — FERROUS SULFATE TAB 325 MG (65 MG ELEMENTAL FE) 325 MG: 325 (65 FE) TAB at 09:50

## 2021-03-20 RX ADMIN — OXYCODONE HYDROCHLORIDE AND ACETAMINOPHEN 1 TABLET: 7.5; 325 TABLET ORAL at 10:49

## 2021-03-20 RX ADMIN — GENTAMICIN SULFATE: 1 OINTMENT TOPICAL at 12:12

## 2021-03-20 RX ADMIN — CLOPIDOGREL BISULFATE 75 MG: 75 TABLET ORAL at 09:50

## 2021-03-20 RX ADMIN — ASPIRIN 81 MG: 81 TABLET, COATED ORAL at 09:50

## 2021-03-20 RX ADMIN — FUROSEMIDE 40 MG: 40 TABLET ORAL at 09:50

## 2021-03-20 RX ADMIN — DOCUSATE SODIUM 100 MG: 100 CAPSULE, LIQUID FILLED ORAL at 09:50

## 2021-03-20 RX ADMIN — OXYCODONE HYDROCHLORIDE AND ACETAMINOPHEN 1 TABLET: 7.5; 325 TABLET ORAL at 00:27

## 2021-03-20 ASSESSMENT — PAIN DESCRIPTION - LOCATION: LOCATION: FOOT

## 2021-03-20 ASSESSMENT — PAIN SCALES - GENERAL: PAINLEVEL_OUTOF10: 6

## 2021-03-20 NOTE — DISCHARGE SUMMARY
Hospital Medicine Discharge Summary    Patient ID: Fiona Kemp      Patient's PCP: Becky Guevara MD    Admit Date: 3/18/2021     Discharge Date: 3/20/2021      Admitting Physician: Dilcia Saleem DO     Discharge Physician: Mckenna Christiansen MD     Discharge Diagnoses: Active Hospital Problems    Diagnosis    Constipation [K59.00]    Mixed hyperlipidemia [E78.2]    HTN (hypertension) [I10]    CAD (coronary artery disease) [I25.10]    Diabetes mellitus ([address] [E11.9]       The patient was seen and examined on day of discharge and this discharge summary is in conjunction with any daily progress note from day of discharge. Hospital Course:   79 y. o. female, with PMH of HTN, HLD, CAD and DM, who presented to [facility] with constipation.  History obtained from the patient and review of EMR.  The patient stated she has not had a bowel movement in 6 days. Jomar Nicely stated that her rectum is beginning to hurt from \"straining\" trying to have a BM.  The patient stated she does have a history of hemorrhoids and has noticed some bright red blood per rectum recently. Jomar Nicely stated she is concerned that her hemorrhoids are obstructing her from having a bowel movement.  The patient stated she was seen by her PCP yesterday and was recommended to use MiraLAX and Preparation H.  She stated she only used 1 dose of MiraLAX and decided to go to the emergency room.  In the emergency room a abdominal x-ray was obtained that revealed indeterminate intestinal gas pattern secondary to paucity of small bowel gas.  CT abdomen pelvis was also obtained that revealed no acute abnormality in the abdomen or pelvis.  Large amount of stool throughout the colon indicates underlying constipation.  A digital rectal exam was done that revealed fecal impaction and nonbleeding external hemorrhoids.  The patient did not tolerate an attempt at a digital disimpaction.  She was given an enema while in the emergency room, but still unable to have a BM.  The patient also received magnesium citrate.  She will be admitted for further evaluation and treatment. The patient denied any other associated symptoms as well as any aggravating and/or alleviating factors. At the time of this assessment, the patient was resting comfortably in bed. She currently denies any chest pain, back pain, abdominal pain, shortness of breath, numbness, tingling, N/V/C/D, fever and/or chills.     Constipation  - opioid induced  - continue bowel regimen  - restarted home opioid     DMII  - poorly controlled  - resume home regimen on discharge     Diabetic foot ulcer  - wound care consulted  - continue home pain control     CAD  - no active chest pain  - continue home ASA, plavix, metoprolol, statin     HTN  - well controlled  - continue home metoprolol     HLD  - continue home statin     Chronic anemia  - stable  - continue home Fe    Physical Exam Performed:     /61   Pulse 57   Temp 98.5 °F (36.9 °C) (Oral)   Resp 16   Ht 5' 1\" (1.549 m)   Wt 154 lb 12.2 oz (70.2 kg)   SpO2 99%   BMI 29.24 kg/m²       General appearance:  Pleasant, elderly female in no apparent distress, appears stated age and cooperative. HEENT: Pupils equal, round, and reactive to light.  Extra ocular muscles intact. Conjunctivae/corneas clear. Neck: Supple, with full range of motion. No jugular venous distention. Trachea midline. Respiratory:  Normal respiratory effort. Clear to auscultation, bilaterally without Rales/Wheezes/Rhonchi. Cardiovascular:  Regular rate and rhythm with normal S1/S2 without murmurs, rubs or gallops. Abdomen: Soft, non-tender, non-distended with normal bowel sounds. Musculoskeletal:  No clubbing, cyanosis or edema bilaterally.  Full range of motion without deformity. Skin: Skin color, texture, turgor normal. Bilateral heel ulcers  Neurologic:  Neurovascularly intact.  Cranial nerves: II-XII intact, grossly non-focal.  Psychiatric:  Alert and oriented, thought content appropriate, normal insight  Capillary Refill: Brisk,< 3 seconds   Peripheral Pulses: +2 palpable, equal bilaterally       Labs: For convenience and continuity at follow-up the following most recent labs are provided:      CBC:    Lab Results   Component Value Date    WBC 4.9 03/19/2021    HGB 9.8 03/19/2021    HCT 30.1 03/19/2021     03/19/2021       Renal:    Lab Results   Component Value Date     03/19/2021    K 2.9 03/19/2021     03/19/2021    CO2 29 03/19/2021    BUN 13 03/19/2021    CREATININE 1.1 03/19/2021    CALCIUM 9.3 03/19/2021    PHOS 3.6 07/13/2010         Significant Diagnostic Studies    Radiology:   CT ABDOMEN PELVIS W IV CONTRAST Additional Contrast? None   Final Result   1. No acute abnormality in the abdomen or pelvis. 2. Large amount of stool throughout the colon indicates underlying   constipation. 3. Incidental cholelithiasis. 4. Severe degenerative change contributing to spinal canal stenosis at L4-5. XR ABDOMEN (KUB) (SINGLE AP VIEW)   Final Result   Indeterminate intestinal gas pattern secondary to paucity of small bowel gas.                 Consults:     None    Disposition:  home     Condition at Discharge: Stable    Discharge Instructions/Follow-up:  Follow up with PCP within 1-2 weeks    Code Status:  Full code    Activity: activity as tolerated    Diet: diabetic diet      Discharge Medications:     Discharge Medication List as of 3/20/2021 11:54 AM           Details   bisacodyl (DULCOLAX) 5 MG EC tablet Take 1 tablet by mouth daily, Disp-30 tablet, R-0Normal      polyethylene glycol (GLYCOLAX) 17 g packet Take 17 g by mouth daily as needed for Constipation, Disp-527 g, R-0Normal              Details   docusate sodium (COLACE) 100 MG capsule Take 1 capsule by mouth 2 times daily, Disp-60 capsule, R-0Normal      Insulin Pen Needle ([employer] PEN NEEDLES 29G) 29G X 12MM MISC DAILY Starting Sun 3/14/2021, Disp-100 each, R-3, Normal      insulin glargine (LANTUS SOLOSTAR) 100 UNIT/ML injection pen Inject 15 Units into the skin nightly, Disp-5 pen, R-1Normal      Blood Glucose Monitoring Suppl (BLOOD GLUCOSE SYSTEM NOEL) KIT Disp-1 kit, R-0, NormalUse as directed to test blood sugar three times a day  E11.9      !! blood glucose test strips (ASCENSIA AUTODISC VI;ONE TOUCH ULTRA TEST VI) strip 2 TIMES DAILY Starting Tue 3/9/2021, Disp-100 each, R-5, NormalAs needed. Lancets MISC Disp-100 each, R-5, NormalUse as directed to test blood sugar three times a day E11.9      magnesium oxide (MAG-OX) 400 MG tablet Take 400 mg by mouth dailyHistorical Med      clindamycin (CLEOCIN) 300 MG capsule Take 300 mg by mouth 3 times daily Pt completed this medHistorical Med      ferrous sulfate (IRON 325) 325 (65 Fe) MG tablet Take 325 mg by mouth daily (with breakfast)Historical Med      rosuvastatin (CRESTOR) 40 MG tablet Take 40 mg by mouth every eveningHistorical Med      oxyCODONE-acetaminophen (PERCOCET) 7.5-325 MG per tablet Take 1 tablet by mouth every 12 hours as needed for Pain. Historical Med      pregabalin (LYRICA) 100 MG capsule Take 100 mg by mouth 2 times daily. Historical Med      baclofen (LIORESAL) 10 MG tablet Take 10 mg by mouth 2 times daily as neededHistorical Med      gentamicin (GARAMYCIN) 0.1 % cream Apply topically daily wtih dressing changes. , Disp-1 Tube, R-3, Normal      clopidogrel (PLAVIX) 75 MG tablet TAKE 1 TABLET EVERY DAY, Disp-90 tablet, R-0Normal      !! blood glucose monitor strips Use as directed to test blood sugar three times a day E11.9, Disp-100 strip, R-3, Normal      metoprolol succinate (TOPROL XL) 25 MG extended release tablet Take 1 tablet by mouth daily, Disp-90 tablet, R-1Normal      furosemide (LASIX) 40 MG tablet Take 1 tablet by mouth daily, Disp-90 tablet, R-2Normal      metFORMIN (GLUCOPHAGE) 1000 MG tablet Take 1 tablet by mouth 2 times daily (with meals), Disp-180 tablet, R-1Normal      traZODone (DESYREL) 50 MG tablet 1 tab at bedtime  For sleep, Disp-90 tablet, R-1Normal      Blood Glucose Monitoring Suppl (CONTOUR NEXT ONE) KIT Test blood sugar twice a day, Disp-1 kit, R-1Normal      aspirin 81 MG tablet Take 1 tablet by mouth daily, Disp-30 tablet, R-3       !! - Potential duplicate medications found. Please discuss with provider. Time Spent on discharge is more than 20 minutes in the examination, evaluation, counseling and review of medications and discharge plan. Signed:    Inez Linda MD   3/20/2021      Thank you Benjamin Newman MD for the opportunity to be involved in this patient's care. If you have any questions or concerns please feel free to contact me at [phone].

## 2021-03-20 NOTE — DISCHARGE INSTR - COC
Continuity of Care Form    Patient Name: Alpa Mitchell   :  1940  MRN:  [id number]    Admit date:  3/18/2021  Discharge date:  3/20/21    Code Status Order: Full Code   Advance Directives:   Advance Care Flowsheet Documentation     Date/Time Healthcare Directive Type of Healthcare Directive Copy in [address] Agent's Name Healthcare Agent's Phone Number    [phone]  No, patient does not have an advance directive for healthcare treatment -- -- -- -- --          Admitting Physician:  Agustin Blackman DO  PCP: Sharita Rosario MD    Discharging Nurse: [facility] Unit/Room#: 2584/3678-74  Discharging Unit Phone Number: ***    Emergency Contact:   Extended Emergency Contact Information  Primary Emergency Contact: Marjan Martinez [place] [address] [place] [address] Phone: [phone]  Relation: Niece/Nephew    Past Surgical History:  Past Surgical History:   Procedure Laterality Date     Fletcher Butner    breast biopys    CARPAL TUNNEL RELEASE Left 2017    [place]    HYSTERECTOMY      partial       Immunization History: There is no immunization history on file for this patient.     Active Problems:  Patient Active Problem List   Diagnosis Code    Diabetes mellitus ([facility] [address] E11.9    Pure hypercholesterolemia E78.00    Bilateral carpal tunnel syndrome G56.03    Lumbar radiculopathy M54.16    Diabetic peripheral neuropathy (HCC) E11.42    CAD (coronary artery disease) I25.10    Pain medication agreement signed Z02.89    OA (osteoarthritis) of knee M17.10    Chondrocalcinosis M11.20    Other specified diabetes mellitus with diabetic neuropathy E13.40    Chest pain on breathing R07.1    Abnormal ECG R94.31    HTN (hypertension) I10    Chest pain R07.9    Abnormal stress test R94.39    Single vessel coronary artery disease I25.10    S/P primary angioplasty with coronary stent Z95.5    Palpitations R00.2    Mixed hyperlipidemia E78.2    Hip pain, bilateral M25.551, M25.552    Hyperkalemia E87.5    Anemia D64.9    Constipation K59.00       Isolation/Infection:   Isolation          No Isolation        Patient Infection Status     None to display          Nurse Assessment:  Last Vital Signs: /61   Pulse 57   Temp 98.5 °F (36.9 °C) (Oral)   Resp 16   Ht 5' 1\" (1.549 m)   Wt 154 lb 12.2 oz (70.2 kg)   SpO2 99%   BMI 29.24 kg/m²     Last documented pain score (0-10 scale): Pain Level: 8  Last Weight:   Wt Readings from Last 1 Encounters:   03/18/21 154 lb 12.2 oz (70.2 kg)     Mental Status:  oriented and alert    IV Access:  - None    Nursing Mobility/ADLs:  Walking   Assisted  Transfer  Assisted  Bathing  Independant  Dressing  Independent  Toileting  Independent  Feeding  Independent  Med Admin  Independent  Med Delivery   whole    Wound Care Documentation and Therapy:  Wound 03/02/21 #2, Right Medial Heel, Diabetic Foot Ulcer, Carbajal 1, onset 11/1/20 (Active)   Wound Image   03/19/21 0945   Wound Etiology Diabetic 03/19/21 2103   Dressing Status Clean;Dry; Intact 03/19/21 2103   Wound Cleansed Cleansed with saline 03/19/21 0945   Dressing/Treatment Pharmaceutical agent (see MAR); Dry dressing 03/19/21 0945   Offloading for Diabetic Foot Ulcers Offloading boot 03/19/21 0945   Dressing Change Due 03/20/21 03/19/21 0945   Wound Length (cm) 3 cm 03/19/21 0945   Wound Width (cm) 4 cm 03/19/21 0945   Wound Depth (cm) 0.2 cm 03/19/21 0945   Wound Surface Area (cm^2) 12 cm^2 03/19/21 0945   Change in Wound Size % (l*w) -400 03/19/21 0945   Wound Volume (cm^3) 2.4 cm^3 03/19/21 0945   Wound Healing % -400 03/19/21 0945   Post-Procedure Length (cm) 2.5 cm 03/16/21 1136   Post-Procedure Width (cm) 4 cm 03/16/21 1136   Post-Procedure Depth (cm) 0.2 cm 03/16/21 1136   Post-Procedure Surface Area (cm^2) 10 cm^2 03/16/21 1136   Post-Procedure Volume (cm^3) 2 cm^3 03/16/21 1136   Distance Tunneling (cm) 0 cm 03/09/21 0972 Undermining Maxium Distance (cm) 0 03/09/21 0821   Wound Assessment Pink/red; Other (Comment) 03/19/21 0945   Drainage Amount Scant 03/19/21 0945   Drainage Description Serosanguinous 03/19/21 0945   Odor None 03/19/21 0945   Zahida-wound Assessment Dry/flaky 03/19/21 0945   Margins Attached edges 03/19/21 0945   Number of days: 18       Wound 03/19/21 Heel Left (Active)   Wound Image   03/19/21 0945   Wound Etiology Diabetic 03/19/21 2103   Dressing Status Clean;Dry; Intact 03/19/21 2103   Wound Cleansed Cleansed with saline 03/19/21 0945   Dressing/Treatment Hydrating gel;Dry dressing 03/19/21 0945   Offloading for Diabetic Foot Ulcers Offloading boot 03/19/21 0945   Dressing Change Due 03/20/21 03/19/21 2103   Wound Length (cm) 5 cm 03/19/21 0945   Wound Width (cm) 5 cm 03/19/21 0945   Wound Depth (cm) 0 cm 03/19/21 0945   Wound Surface Area (cm^2) 25 cm^2 03/19/21 0945   Wound Volume (cm^3) 0 cm^3 03/19/21 0945   Wound Assessment Dry;Pink/red 03/19/21 0945   Drainage Amount None 03/19/21 0945   Odor None 03/19/21 0945   Zahida-wound Assessment Dry/flaky 03/19/21 0945   Margins Attached edges; Defined edges 03/19/21 0945   Number of days: 1        Elimination:  Continence:   · Bowel: Yes  · Bladder: Yes  Urinary Catheter: None   Colostomy/Ileostomy/Ileal Conduit: No       Date of Last BM: 3/20/21    Intake/Output Summary (Last 24 hours) at 3/20/2021 1143  Last data filed at 3/19/2021 2107  Gross per 24 hour   Intake 1032 ml   Output    Net 1032 ml     I/O last 3 completed shifts: In: 3581 [P.O.:500;  I.V.:542]  Out: -     Safety Concerns:     None    Impairments/Disabilities:      None    Nutrition Therapy:  Current Nutrition Therapy:   - Oral Diet:  General    Routes of Feeding: Oral  Liquids: No Restrictions  Daily Fluid Restriction: no  Last Modified Barium Swallow with Video (Video Swallowing Test): not done    Treatments at the Time of Hospital Discharge:   Respiratory Treatments: ***  Oxygen Therapy:  is not on home oxygen therapy. Ventilator:    - No ventilator support    Rehab Therapies: ***  Weight Bearing Status/Restrictions: No weight bearing restirctions  Other Medical Equipment (for information only, NOT a DME order):  walker  Other Treatments:     Patient's personal belongings (please select all that are sent with patient):  None    RN SIGNATURE:  Electronically signed by Jessica Toro RN on 3/20/21 at 11:53 AM EDT    CASE MANAGEMENT/SOCIAL WORK SECTION    Inpatient Status Date: ***    Readmission Risk Assessment Score:  Readmission Risk              Risk of Unplanned Readmission:        0           Discharging to Facility/ Agency   · Name:   · Address:  · Phone:  · Fax:    Dialysis Facility (if applicable)   · Name:  · Address:  · Dialysis Schedule:  · Phone:  · Fax:    / signature: {Esignature:[id number]}    PHYSICIAN SECTION    Prognosis: {Prognosis:7492132329}    Condition at Discharge: Sabino Pate Feroz Patient Condition:[id number]}    Rehab Potential (if transferring to Rehab): {Prognosis:5326665541}    Recommended Labs or Other Treatments After Discharge: ***    Physician Certification: I certify the above information and transfer of [employer]  is necessary for the continuing treatment of the diagnosis listed and that she requires {Admit to Appropriate Level of Care:50641} for {GREATER/LESS:079699867} 30 days.      Update Admission H&P: {CHP DME Changes in IKNAR:711473316}    PHYSICIAN SIGNATURE:  {Esignature:[id number]}

## 2021-03-20 NOTE — PROGRESS NOTES
Assessment complete and charted earlier in shift.   -wound dressings applied by wound care RN on AM shift per report. Dressings remain C/D/I.   -Pt with more formed BMs. Medicated per MAR. Call light within reach, will continue to monitor.

## 2021-03-20 NOTE — PLAN OF CARE
Pt is a fall risk. Bed in lowest position with wheels locked and side rails x2. Non slip socks and bed alarm on with room door open and call light within reach- will continue to monitor.

## 2021-03-20 NOTE — CARE COORDINATION
D/c order noted. Spoke with Dedrick MCLAIN. Aware patient follows at [facility] wound clinic, has appt scheduled. Stated will not have a ride home. Will provide cab voucher. Dedrick aware to check address, listed address is not correct per patient.  Bridget Saenz RN

## 2021-03-22 ASSESSMENT — ENCOUNTER SYMPTOMS
CONSTIPATION: 1
DIARRHEA: 0
COUGH: 0
CHEST TIGHTNESS: 0
NAUSEA: 0
VOMITING: 0

## 2021-03-22 NOTE — PROGRESS NOTES
Mary Ellen Terrazas (:  1940) is a 79 y.o. female,Established patient, here for evaluation of the following chief complaint(s):  Diabetes  Constipation    ASSESSMENT/PLAN:  1. Drug-induced constipation  2. Type 2 diabetes mellitus with diabetic neuropathy, with long-term current use of insulin (HCC)  -continue lantus  30 units f daily    Return in about 3 weeks (around 2021). SUBJECTIVE/OBJECTIVE:  HPI  CC- diabetes mellitus blood sugar finally down to the average of 130 mg % taking 30 units of lantus. Insulin    Constipation-  On oxycodone, may need to be off narcs. Advised to take miralax nightly for now. Review of Systems   Constitutional: Positive for activity change. Eyes:        Exam on - eye exam-no retinopathy, has cataracts   Respiratory: Negative for cough and chest tightness. Gastrointestinal: Positive for constipation. Negative for diarrhea, nausea and vomiting. Genitourinary: Negative. Musculoskeletal: Positive for arthralgias. Negative for neck stiffness. Neurological: Positive for numbness. Negative for dizziness and headaches. Pain and numbness both feet. And both hands     Psychiatric/Behavioral: Positive for sleep disturbance. Physical Exam  Vitals signs and nursing note reviewed. Constitutional:       Appearance: She is well-developed. HENT:      Head: Normocephalic. Eyes:      Pupils: Pupils are equal, round, and reactive to light. Neck:      Musculoskeletal: Normal range of motion. Cardiovascular:      Rate and Rhythm: Normal rate and regular rhythm. Pulmonary:      Effort: Pulmonary effort is normal.      Breath sounds: Normal breath sounds. Abdominal:      Palpations: Abdomen is soft. Musculoskeletal: Normal range of motion. Skin:     Comments: Feet ulcers   Neurological:      Mental Status: She is alert and oriented to person, place, and time. Sensory: Sensory deficit present.       Comments: Bilateral legs peripheral neuropathy       Continue lantus, continue to monitor blood sugar  Closely. Need to  Wean off  narcs contributing to constipation. Retun in  3 weeks. An electronic signature was used to authenticate this note.     --Becky Guevara MD

## 2021-03-23 ENCOUNTER — HOSPITAL ENCOUNTER (OUTPATIENT)
Dept: WOUND CARE | Age: 81
Discharge: HOME | End: 2021-03-23
Payer: MEDICARE

## 2021-03-23 ENCOUNTER — TELEPHONE (OUTPATIENT)
Dept: FAMILY MEDICINE CLINIC | Age: 81
End: 2021-03-23

## 2021-03-23 VITALS
HEART RATE: 52 BPM | TEMPERATURE: 100.1 F | DIASTOLIC BLOOD PRESSURE: 57 MMHG | SYSTOLIC BLOOD PRESSURE: 118 MMHG | RESPIRATION RATE: 18 BRPM

## 2021-03-23 DIAGNOSIS — L97.412 ULCER OF RIGHT HEEL, WITH FAT LAYER EXPOSED (HCC): ICD-10-CM

## 2021-03-23 DIAGNOSIS — I73.89 OTHER SPECIFIED PERIPHERAL VASCULAR DISEASES (HCC): Primary | ICD-10-CM

## 2021-03-23 PROCEDURE — 29581 APPL MULTLAYER CMPRN SYS LEG: CPT

## 2021-03-23 PROCEDURE — 11042 DBRDMT SUBQ TIS 1ST 20SQCM/<: CPT

## 2021-03-23 RX ORDER — LIDOCAINE 40 MG/G
CREAM TOPICAL ONCE
Status: DISCONTINUED | OUTPATIENT
Start: 2021-03-23 | End: 2021-03-24 | Stop reason: HOSPADM

## 2021-03-23 ASSESSMENT — PAIN DESCRIPTION - ORIENTATION: ORIENTATION: RIGHT;LEFT

## 2021-03-23 ASSESSMENT — PAIN DESCRIPTION - ONSET: ONSET: ON-GOING

## 2021-03-23 ASSESSMENT — PAIN DESCRIPTION - FREQUENCY: FREQUENCY: OTHER (COMMENT)

## 2021-03-23 ASSESSMENT — PAIN SCALES - GENERAL: PAINLEVEL_OUTOF10: 3

## 2021-03-23 ASSESSMENT — PAIN DESCRIPTION - PROGRESSION: CLINICAL_PROGRESSION: GRADUALLY WORSENING

## 2021-03-23 ASSESSMENT — PAIN DESCRIPTION - LOCATION: LOCATION: FOOT

## 2021-03-23 ASSESSMENT — PAIN - FUNCTIONAL ASSESSMENT: PAIN_FUNCTIONAL_ASSESSMENT: PREVENTS OR INTERFERES SOME ACTIVE ACTIVITIES AND ADLS

## 2021-03-23 ASSESSMENT — PAIN DESCRIPTION - PAIN TYPE: TYPE: ACUTE PAIN

## 2021-03-23 NOTE — PROGRESS NOTES
[facility]  Progress Note and Procedure Note      Rosa Mora  AGE: 79 y.o. GENDER: female  : 1940  TODAY'S DATE:  3/23/2021    Subjective:     Chief Complaint   Patient presents with    Wound Check         HISTORY of PRESENT ILLNESS HPI     Rosa Mora is a 79 y.o. female who presents today for wound evaluation. History of Wound: Patient notes wound is been present for at least a month on the right and for 2 weeks on the left. She admits to being diabetic for greater than 40 years. She admits that she was in the hospital for constipation. She states since then after discharge she still has not gone had a bowel movement. However she also has not been taking the medication or drinking the water as directed on discharge. She denies current nausea, vomiting, fever, chills, shortness of breath or chest pain.     Wound Pain:  intermittent  Severity:  2 / 10   Wound Type:  diabetic, pressure and Possible burn  Modifying Factors:  edema, venous stasis, lymphedema, diabetes, poor glucose control, chronic pressure, decreased mobility, shear force, obesity and arterial insufficiency  Associated Signs/Symptoms:  edema, drainage and pain        PAST MEDICAL HISTORY        Diagnosis Date    Ankle swelling     Arthritis     Breast cancer ([facility] [address] 6/15/1999    CAD (coronary artery disease) 10/13/2012    Cancer ([facility] [address] [facility]     breast    Constipation 2021    inpatient admission x 2 days    Generalized headaches     HTN (hypertension) 2015    Hyperlipidemia     OA (osteoarthritis) of knee 2015    Type II or unspecified type diabetes mellitus without mention of complication, not stated as uncontrolled        PAST SURGICAL HISTORY    Past Surgical History:   Procedure Laterality Date    BACK SURGERY  ,     BREAST SURGERY      breast biopys    [place]EL [place] Left 2017    [place]    HYSTERECTOMY      partial       FAMILY HISTORY    Family History   Problem Relation Age of Onset    Heart Disease Brother     Hearing Loss Mother     High Blood Pressure Mother        SOCIAL HISTORY    Social History     Tobacco Use    Smoking status: Never Smoker    Smokeless tobacco: Never Used    Tobacco comment: 3/2/21   Substance Use Topics    Alcohol use: No     Alcohol/week: 0.0 standard drinks    Drug use: No       ALLERGIES    Allergies   Allergen Reactions    Alendronate Sodium Anaphylaxis    Influenza Vaccines     Pneumococcal Vaccines Other (See Comments)    Rofecoxib Swelling    Vioxx     Sulfa Antibiotics Rash       MEDICATIONS    Current Outpatient Medications on File Prior to Encounter   Medication Sig Dispense Refill    bisacodyl (DULCOLAX) 5 MG EC tablet Take 1 tablet by mouth daily 30 tablet 0    polyethylene glycol (GLYCOLAX) 17 g packet Take 17 g by mouth daily as needed for Constipation 527 g 0    docusate sodium (COLACE) 100 MG capsule Take 1 capsule by mouth 2 times daily 60 capsule 0    Insulin Pen Needle ([employer] PEN NEEDLES 29G) 29G X 12MM MISC 1 each by Does not apply route daily 100 each 3    insulin glargine (LANTUS SOLOSTAR) 100 UNIT/ML injection pen Inject 15 Units into the skin nightly (Patient taking differently: Inject 30 Units into the skin nightly ) 5 pen 1    Blood Glucose Monitoring Suppl (BLOOD GLUCOSE SYSTEM NOEL) KIT Use as directed to test blood sugar three times a day  E11.9 1 kit 0    blood glucose test strips (ASCENSIA AUTODISC VI;ONE TOUCH ULTRA TEST VI) strip 1 each by In Vitro route 2 times daily As needed.  100 each 5    Lancets MISC Use as directed to test blood sugar three times a day E11.9 100 each 5    magnesium oxide (MAG-OX) 400 MG tablet Take 400 mg by mouth daily      ferrous sulfate (IRON 325) 325 (65 Fe) MG tablet Take 325 mg by mouth daily (with breakfast)      rosuvastatin (CRESTOR) 40 MG tablet Take 40 mg by mouth every evening      oxyCODONE-acetaminophen (PERCOCET) 7.5-325 MG per tablet Take 1 tablet by mouth every 12 hours as needed for Pain.  pregabalin (LYRICA) 100 MG capsule Take 100 mg by mouth 2 times daily.  baclofen (LIORESAL) 10 MG tablet Take 10 mg by mouth 2 times daily as needed      gentamicin (GARAMYCIN) 0.1 % cream Apply topically daily wtih dressing changes. 1 Tube 3    clopidogrel (PLAVIX) 75 MG tablet TAKE 1 TABLET EVERY DAY 90 tablet 0    blood glucose monitor strips Use as directed to test blood sugar three times a day E11.9 100 strip 3    metoprolol succinate (TOPROL XL) 25 MG extended release tablet Take 1 tablet by mouth daily (Patient taking differently: Take 50 mg by mouth 2 times daily ) 90 tablet 1    furosemide (LASIX) 40 MG tablet Take 1 tablet by mouth daily 90 tablet 2    Blood Glucose Monitoring Suppl (CONTOUR NEXT ONE) KIT Test blood sugar twice a day 1 kit 1    aspirin 81 MG tablet Take 1 tablet by mouth daily 30 tablet 3     No current facility-administered medications on file prior to encounter. REVIEW OF SYSTEMS    Pertinent items are noted in HPI. Objective:      BP (!) 118/57   Pulse 52   Temp 100.1 °F (37.8 °C) (Oral) Comment: following drinking a hot drink  Resp 18     PHYSICAL EXAM    Vascular: Vascular status Intact  palpable pedal pulses, right DP1/4 and PT1/4, left DP1/4 and PT1/4. Hair growthAbsent  both lower extremities and feet. Skin temperature is warm to cool from pretibial area to distal digits bilateral.  Exam is negative for rubor, pallor, cyanosis or signs of acute vascular compromise bilaterally. Exam is positive for edema bilateral lower extremity. Varicosities Present bilateral lower extremity. Neuro: Neurologic status diminished bilateral with epicritic diminished, proprioceptive Absent , vibratory sensationAbsent  and protopathic Present. DTRs Present bilateral Achilles. There were no reproducible neuritic symptoms on exam bilateral feet/ankles. Derm: Ulceration to bilateral medial heels. Ecchymosis Absent  bilateral feet/foot. Musculoskeletal: Mild pain with debridement of wounds 5/5 muscle strength in/eversion and dorsi/plantarflexion bilateral feet. No gross instability noted. Assessment:     Patient Active Problem List   Diagnosis    Diabetes mellitus ([address]    Pure hypercholesterolemia    Bilateral carpal tunnel syndrome    Lumbar radiculopathy    Diabetic peripheral neuropathy (HCC)    CAD (coronary artery disease)    Pain medication agreement signed    OA (osteoarthritis) of knee    Chondrocalcinosis    Other specified diabetes mellitus with diabetic neuropathy    Chest pain on breathing    Abnormal ECG    HTN (hypertension)    Chest pain    Abnormal stress test    Single vessel coronary artery disease    S/P primary angioplasty with coronary stent    Palpitations    Mixed hyperlipidemia    Hip pain, bilateral    Hyperkalemia    Anemia    Constipation       Procedure Note    Performed by: Gael Burgos DPM    Consent obtained: Yes    Time out taken:  Yes    Pain Control: Anesthetic  Anesthetic: 4% Lidocaine Cream     Debridement:Excisional Debridement    Using curette, scissors and forceps the wound was sharply debrided    down through and including the removal of epidermis, dermis and subcutaneous tissue. Devitalized Tissue Debrided:  fibrin, biofilm, slough, necrotic/eschar, exudate and callus    Pre Debridement Measurements:  Are located in the Wound Documentation Flow Sheet    Wound #: 1 and 2     Wound Care Documentation:  Wound 03/02/21 #2, Right Medial Heel, Diabetic Foot Ulcer, Carbajal 1, onset 11/1/20 (Active)   Wound Image   03/19/21 0945   Wound Etiology Diabetic Carbajal 1 03/23/21 1114   Dressing Status Clean;Dry; Intact 03/19/21 2103   Wound Cleansed Irrigated with saline; Soap and water 03/23/21 1114   Dressing/Treatment Pharmaceutical agent (see MAR); Dry dressing 03/19/21 0945   Offloading for Diabetic Foot Ulcers Offloading boot 03/19/21 0945   Dressing Change Due 03/20/21 03/19/21 0945   Wound Length (cm) 2 cm 03/23/21 1114   Wound Width (cm) 3.3 cm 03/23/21 1114   Wound Depth (cm) 0.2 cm 03/23/21 1114   Wound Surface Area (cm^2) 6.6 cm^2 03/23/21 1114   Change in Wound Size % (l*w) -175 03/23/21 1114   Wound Volume (cm^3) 1.32 cm^3 03/23/21 1114   Wound Healing % -175 03/23/21 1114   Post-Procedure Length (cm) 2 cm 03/23/21 1150   Post-Procedure Width (cm) 3.3 cm 03/23/21 1150   Post-Procedure Depth (cm) 0.2 cm 03/23/21 1150   Post-Procedure Surface Area (cm^2) 6.6 cm^2 03/23/21 1150   Post-Procedure Volume (cm^3) 1.32 cm^3 03/23/21 1150   Distance Tunneling (cm) 0 cm 03/09/21 0821   Undermining Maxium Distance (cm) 0 03/09/21 0821   Wound Assessment Pink/red 03/23/21 1114   Drainage Amount Small 03/23/21 1114   Drainage Description Serosanguinous 03/23/21 1114   Odor None 03/23/21 1114   Zahida-wound Assessment Hyperkeratosis (callous) 03/23/21 1114   Margins Attached edges 03/23/21 1114   Number of days: 21       Total Surface Area Debrided: 6.6 Square centimeters into the subcutaneous of the right heel    Percentage of wound debrided 100%    Bleeding:  Minimal    Hemostasis Achieved:  by pressure    Procedural Pain:  0  / 10     Post Procedural Pain:  0 / 10     Response to treatment:  Well tolerated by patient. Plan:   Patient examined and evaluated  Wound right without incident  All questions answered to patient satisfaction  Again encouraged appropriate offloading of the heel including offloading boots. Most likely this is due to pressure or a burn  Daily dressing changes with gentamicin cream  Vascular arterial studies ordered  Recommend following up with primary care due to constipation  Recommend following discharge orders for constipation treatment  Reinforced a diabetic diet and taking medications appropriately she is to see her primary care later this week.   Keep foot offloaded  Follow-up in 1 week  The nature of the patient's condition was explained in depth.  The patient was informed that their compliance to the treatment plan is paramount to successful healing and prevention of further ulceration and/or infection     Discharge Treatment  Daily dressing changes with compression keep legs elevated at all times and not active    Written Patient Discharge Instructions Given            Electronically signed by Jeannine Sue DPM on 3/23/2021 at 11:59 AM

## 2021-03-23 NOTE — PLAN OF CARE
Pt seen in [facility] - Left heel fragile and healed- applied aquaphor and border - spandigrip for compression. Right heel improved from last week - more swelling noted- treatment changed to gentamycin cream and collagen after debridement today - will apply calamine coflex this week for more  compression- reviewed AVS - stressed controlling BS and elevation- f/u in 1 week.  Pt also needs to be scheduled for arterial studies in near future- 4-6-21

## 2021-03-24 ENCOUNTER — TELEPHONE (OUTPATIENT)
Dept: FAMILY MEDICINE CLINIC | Age: 81
End: 2021-03-24

## 2021-03-24 NOTE — TELEPHONE ENCOUNTER
[facility] Transitions Initial Follow Up Call    Outreach made within 2 business days of discharge: Yes    Patient: Rober Adame Patient : 1940   MRN: <[id number]>  Reason for Admission: There are no discharge diagnoses documented for the most recent discharge. Discharge Date: 3/20/21       Spoke with: patient had appointment for would check .   Has FU with PCP in April    Discharge department/facility: [facility]    Scheduled appointment with PCP within 7-14 days    Follow Up  Future Appointments   Date Time Provider Deepak Olson   3/30/2021 10:30 AM Johnanna Nims, DPM [facility] [place] [facility]   2021 10:30 AM Johnanna Nims, DPM [facility] [place] [facility]   2021  1:00 PM [facility] VASCULAR, VASCULAR ROOM [address] [facility] [place] [facility]   2021  2:20 PM Nathan Dinero MD [facility]   2021 10:30 AM Johnanna Nims, DPM [place] [place] [facility]   2021 10:30 AM Johnanna Nims, DPM [facility] [place] [facility]   2021 10:30 AM Johnanna Nims, DPM [facility] [place] [facility]   2021 10:30 AM Johnanna Nims, DPM [facility] [place] [facility]   2021 10:30 AM Johnanna Nims, DPM [facility] [place] [facility]   2021 10:30 AM Johnanna Nims, DPM [facility] [place] [facility]   2021 10:30 AM Johnanna Nims, DPM [facility] [place] [facility]   2021 10:30 AM Johnanna Nims, DPM [facility] [place] [facility]   2021 10:30 AM Johnanna Nims, DPM [facility] [place] [facility]   6/15/2021 10:30 AM Johnanna Nims, DPM [facility] [place] [facility]   2021 10:30 AM Johnanna Nims, DPM [facility] [place] [facility]   2021 10:30 AM Johnanna Nims, DPM [address], [place]

## 2021-03-24 NOTE — TELEPHONE ENCOUNTER
[facility] Transitions Initial Follow Up Call    Outreach made within 2 business days of discharge: No    Patient: Natalya Ba Patient : 1940   MRN: <[id number]>  Reason for Admission: Constipation  Discharge Date: 3/20/21       Spoke with: Patient    Discharge department/facility: [facility]    TCM Interactive Patient Contact:  Was patient able to fill all prescriptions: Yes  Was patient instructed to bring all medications to the follow-up visit: Yes  Is patient taking all medications as directed in the discharge summary?  Yes  Does patient understand their discharge instructions: Yes  Does patient have questions or concerns that need addressed prior to 7-14 day follow up office visit: no    Scheduled appointment with PCP within 7-14 days    Follow Up  Future Appointments   Date Time Provider Deepak Olson   3/30/2021 10:30 AM Leandrew Hedges, DPM [place] [facility]   3/30/2021  1:40 PM Alee Rachel MD [facility]   2021 10:30 AM Leandrew Hedges, DPM [facility] [place] [facility]   2021  1:00 PM [facility] VASCULAR, VASCULAR ROOM 1 [facility]  [place] [facility]   2021 10:30 AM Leandrew Hedges, DPM [facility] [place] [facility]   2021 10:30 AM Leandrew Hedges, DPM [facility] [facility] [place] [facility]   2021 10:30 AM Leandrew Hedges, DPM [facility] [place] [facility]   2021 10:30 AM Leandrew Hedges, DPM [facility] [place] [facility]   2021 10:30 AM Leandrew Hedges, DPM [facility] [place] [facility]   2021 10:30 AM Leandrew Hedges, DPM [facility] [place] [facility]   2021 10:30 AM Leandrew Hedges, DPM [facility] [place] [facility]   2021 10:30 AM Leandrew Hedges, DPM [place] [place] [facility]   2021 10:30 AM Leandrew Hedges, DPM [facility] [place] [facility]   6/15/2021 10:30 AM Leandrew Hedges, DPM [facility] [place] [facility]   2021 10:30 AM [facility] Hay [facility]PM [facility] [place]   2021 10:30 AM Elena Hay DPM [address] [place] [address]

## 2021-03-30 ENCOUNTER — OFFICE VISIT (OUTPATIENT)
Dept: FAMILY MEDICINE CLINIC | Age: 81
End: 2021-03-30
Payer: MEDICARE

## 2021-03-30 ENCOUNTER — HOSPITAL ENCOUNTER (OUTPATIENT)
Dept: WOUND CARE | Age: 81
Discharge: HOME | End: 2021-03-30
Payer: MEDICARE

## 2021-03-30 ENCOUNTER — HOSPITAL ENCOUNTER (OUTPATIENT)
Dept: GENERAL RADIOLOGY | Age: 81
Discharge: HOME | End: 2021-03-30
Payer: MEDICARE

## 2021-03-30 VITALS
DIASTOLIC BLOOD PRESSURE: 70 MMHG | HEART RATE: 81 BPM | WEIGHT: 158.6 LBS | RESPIRATION RATE: 20 BRPM | BODY MASS INDEX: 29.94 KG/M2 | HEIGHT: 61 IN | SYSTOLIC BLOOD PRESSURE: 123 MMHG | TEMPERATURE: 99 F

## 2021-03-30 VITALS
RESPIRATION RATE: 16 BRPM | DIASTOLIC BLOOD PRESSURE: 72 MMHG | WEIGHT: 161.6 LBS | BODY MASS INDEX: 30.53 KG/M2 | TEMPERATURE: 97.7 F | OXYGEN SATURATION: 98 % | HEART RATE: 61 BPM | SYSTOLIC BLOOD PRESSURE: 118 MMHG

## 2021-03-30 DIAGNOSIS — Z79.4 TYPE 2 DIABETES MELLITUS WITH DIABETIC NEUROPATHY, WITH LONG-TERM CURRENT USE OF INSULIN (HCC): Primary | ICD-10-CM

## 2021-03-30 DIAGNOSIS — G89.29 CHRONIC PAIN OF RIGHT KNEE: ICD-10-CM

## 2021-03-30 DIAGNOSIS — M25.561 CHRONIC PAIN OF RIGHT KNEE: ICD-10-CM

## 2021-03-30 DIAGNOSIS — M25.562 CHRONIC PAIN OF BOTH KNEES: ICD-10-CM

## 2021-03-30 DIAGNOSIS — E78.00 PURE HYPERCHOLESTEROLEMIA: ICD-10-CM

## 2021-03-30 DIAGNOSIS — E11.40 TYPE 2 DIABETES MELLITUS WITH DIABETIC NEUROPATHY, WITH LONG-TERM CURRENT USE OF INSULIN (HCC): Primary | ICD-10-CM

## 2021-03-30 DIAGNOSIS — G89.29 CHRONIC PAIN OF LEFT KNEE: ICD-10-CM

## 2021-03-30 DIAGNOSIS — M25.561 CHRONIC PAIN OF BOTH KNEES: ICD-10-CM

## 2021-03-30 DIAGNOSIS — I10 BENIGN ESSENTIAL HTN: ICD-10-CM

## 2021-03-30 DIAGNOSIS — M25.562 CHRONIC PAIN OF LEFT KNEE: ICD-10-CM

## 2021-03-30 DIAGNOSIS — G89.29 CHRONIC PAIN OF BOTH KNEES: ICD-10-CM

## 2021-03-30 DIAGNOSIS — K59.03 DRUG-INDUCED CONSTIPATION: ICD-10-CM

## 2021-03-30 PROCEDURE — 1036F TOBACCO NON-USER: CPT | Performed by: INTERNAL MEDICINE

## 2021-03-30 PROCEDURE — 1123F ACP DISCUSS/DSCN MKR DOCD: CPT | Performed by: INTERNAL MEDICINE

## 2021-03-30 PROCEDURE — G8427 DOCREV CUR MEDS BY ELIG CLIN: HCPCS | Performed by: INTERNAL MEDICINE

## 2021-03-30 PROCEDURE — 29581 APPL MULTLAYER CMPRN SYS LEG: CPT

## 2021-03-30 PROCEDURE — G8484 FLU IMMUNIZE NO ADMIN: HCPCS | Performed by: INTERNAL MEDICINE

## 2021-03-30 PROCEDURE — G8417 CALC BMI ABV UP PARAM F/U: HCPCS | Performed by: INTERNAL MEDICINE

## 2021-03-30 PROCEDURE — 11042 DBRDMT SUBQ TIS 1ST 20SQCM/<: CPT

## 2021-03-30 PROCEDURE — G8400 PT W/DXA NO RESULTS DOC: HCPCS | Performed by: INTERNAL MEDICINE

## 2021-03-30 PROCEDURE — 1090F PRES/ABSN URINE INCON ASSESS: CPT | Performed by: INTERNAL MEDICINE

## 2021-03-30 PROCEDURE — 73560 X-RAY EXAM OF KNEE 1 OR 2: CPT

## 2021-03-30 PROCEDURE — 4040F PNEUMOC VAC/ADMIN/RCVD: CPT | Performed by: INTERNAL MEDICINE

## 2021-03-30 PROCEDURE — 99214 OFFICE O/P EST MOD 30 MIN: CPT | Performed by: INTERNAL MEDICINE

## 2021-03-30 RX ORDER — LIDOCAINE 40 MG/G
CREAM TOPICAL ONCE
Status: DISCONTINUED | OUTPATIENT
Start: 2021-03-30 | End: 2021-03-31 | Stop reason: HOSPADM

## 2021-03-30 RX ORDER — PREGABALIN 75 MG/1
75 CAPSULE ORAL 3 TIMES DAILY
Qty: 90 CAPSULE | Refills: 0 | Status: SHIPPED | OUTPATIENT
Start: 2021-03-30 | End: 2021-04-29

## 2021-03-30 RX ORDER — ACETAMINOPHEN 325 MG/1
650 TABLET ORAL EVERY 6 HOURS PRN
COMMUNITY

## 2021-03-30 ASSESSMENT — PAIN DESCRIPTION - ORIENTATION: ORIENTATION: RIGHT

## 2021-03-30 ASSESSMENT — PAIN DESCRIPTION - PAIN TYPE: TYPE: CHRONIC PAIN

## 2021-03-30 ASSESSMENT — PATIENT HEALTH QUESTIONNAIRE - PHQ9
SUM OF ALL RESPONSES TO PHQ QUESTIONS 1-9: 0
SUM OF ALL RESPONSES TO PHQ9 QUESTIONS 1 & 2: 0

## 2021-03-30 ASSESSMENT — PAIN DESCRIPTION - DESCRIPTORS: DESCRIPTORS: OTHER (COMMENT)

## 2021-03-30 ASSESSMENT — PAIN SCALES - GENERAL: PAINLEVEL_OUTOF10: 8

## 2021-03-30 NOTE — PROGRESS NOTES
strips  Use as directed to test blood sugar three times a day E11.9             Blood Glucose Monitoring Suppl (BLOOD GLUCOSE SYSTEM NOEL) KIT  Use as directed to test blood sugar three times a day  E11.9             Blood Glucose Monitoring Suppl (CONTOUR NEXT ONE) KIT  Test blood sugar twice a day             blood glucose test strips (ASCENSIA AUTODISC VI;ONE TOUCH ULTRA TEST VI) strip  1 each by In Vitro route 2 times daily As needed. clopidogrel (PLAVIX) 75 MG tablet  TAKE 1 TABLET EVERY DAY             docusate sodium (COLACE) 100 MG capsule  Take 1 capsule by mouth 2 times daily             ferrous sulfate (IRON 325) 325 (65 Fe) MG tablet  Take 325 mg by mouth daily (with breakfast)             furosemide (LASIX) 40 MG tablet  Take 1 tablet by mouth daily             gentamicin (GARAMYCIN) 0.1 % cream  Apply topically daily wtih dressing changes. insulin glargine (LANTUS SOLOSTAR) 100 UNIT/ML injection pen  Inject 15 Units into the skin nightly             Insulin Pen Needle ([employer] PEN NEEDLES 29G) 29G X 12MM MISC  1 each by Does not apply route daily             Lancets MISC  Use as directed to test blood sugar three times a day E11.9             magnesium oxide (MAG-OX) 400 MG tablet  Take 400 mg by mouth daily             metoprolol succinate (TOPROL XL) 25 MG extended release tablet  Take 1 tablet by mouth daily             oxyCODONE-acetaminophen (PERCOCET) 7.5-325 MG per tablet  Take 1 tablet by mouth every 12 hours as needed for Pain.             polyethylene glycol (GLYCOLAX) 17 g packet  Take 17 g by mouth daily as needed for Constipation             pregabalin (LYRICA) 100 MG capsule  Take 100 mg by mouth 2 times daily.              rosuvastatin (CRESTOR) 40 MG tablet  Take 40 mg by mouth every evening                   Medications marked \"taking\" at this time  No outpatient medications have been marked as taking for the 3/30/21 encounter (Office Visit) with Sunni Reed, MD.        Medications patient taking as of now reconciled against medications ordered at time of hospital discharge: Yes    Chief Complaint   Patient presents with    Follow-Up from Hospital     Constipation       HPI  Patient admitted for severe obstipation, narcotic induced, was given enema in the hospital and clen out well. Advised to stop percoce. Will v call her pain provider to change her pain med. Inpatient course: Discharge summary reviewed- see chart. Interval history/Current status: Fair    Review of Systems   Constitutional: Positive for activity change. Eyes:        Exam on 2015- eye exam-no retinopathy, has cataracts   Respiratory: Negative for cough and chest tightness. Gastrointestinal: Positive for constipation. Negative for diarrhea, nausea and vomiting. Genitourinary: Negative. Musculoskeletal: Positive for arthralgias. Negative for neck stiffness. Skin:        Sores both feet   Neurological: Positive for numbness. Negative for dizziness and headaches. Pain and numbness both feet. And both hands     Psychiatric/Behavioral: Positive for sleep disturbance. There were no vitals filed for this visit. There is no height or weight on file to calculate BMI. Wt Readings from Last 3 Encounters:   03/18/21 154 lb 12.2 oz (70.2 kg)   03/16/21 153 lb 12.8 oz (69.8 kg)   03/09/21 152 lb (68.9 kg)     BP Readings from Last 3 Encounters:   03/23/21 (!) 118/57   03/20/21 116/61   03/17/21 102/60       Physical Exam  Vitals signs and nursing note reviewed. Constitutional:       Appearance: She is well-developed. HENT:      Head: Normocephalic. Eyes:      Pupils: Pupils are equal, round, and reactive to light. Neck:      Musculoskeletal: Normal range of motion and neck supple. Cardiovascular:      Rate and Rhythm: Normal rate and regular rhythm. Pulmonary:      Effort: Pulmonary effort is normal.      Breath sounds: Normal breath sounds.    Abdominal:      Palpations: Abdomen is soft. Musculoskeletal: Normal range of motion. Skin:     Comments: Feet ulcers   Neurological:      Mental Status: She is alert and oriented to person, place, and time. Comments: Bilateral legs peripheral neuropathy       Assessment/Plan:  1. Drug-induced constipation  -will have provider change pain med    2. Type 2 diabetes mellitus with diabetic neuropathy, with long-term current use of insulin (HCC)  -increase lyrica to 75 mg TID    3. Benign essential HTN  -on metoprolol 25 mg XL daily    4. Pure hypercholesterolemia  -on rosuvastatin 40 mg daily    5. Chronic pain of both knees  - x-ray of both knees       Medical Decision Making: moderate complexity    Follow up in 2 months if still here, is going home to [place] when things get settled here. Reviewed  previous notes, tests results and face to face with the patient discussing the diagnosis and importance of compliance with the treatment plan as well as  Documenting on the day of visit.

## 2021-03-30 NOTE — PLAN OF CARE
Pt seen inn [address] [place] [address],[address] - Left heel wound remains healed - needs to moistured daily - cont compression with spandigrip. Right heel improving slowly - debride per Dr. Jacki Miranda - cont gentamycin cream/ collagen to wound and triad to sebastien wound - will cont compression with calamine co flex - Pt has arterial studies scheduled for next Tuesday - reviewed AVS - F/U in 1 week.  Discussed dietary changes and controlling Blood sugars

## 2021-03-30 NOTE — PROGRESS NOTES
[address] [facility]  Progress Note and Procedure Note      Rosa Mora  AGE: 79 y.o. GENDER: female  : 1940  TODAY'S DATE:  3/30/2021    Subjective:     Chief Complaint   Patient presents with    Wound Check         HISTORY of PRESENT ILLNESS HPI     Rosa Mora is a 79 y.o. female who presents today for wound evaluation. History of Wound: Patient notes wound is been present for at least a month on the right and for 2 weeks on the left. She admits to being diabetic for greater than 40 years. She admits to fleeting pain in her right foot and the toes and lateral aspect she describes as aching and burning. She admits to history of neuropathy. She left the bandage intact since her last visit. They have not gotten the arterial studies yet. She denies current nausea, vomiting, fever, chills, shortness of breath or chest pain.     Wound Pain:  intermittent  Severity:  2 / 10   Wound Type:  diabetic, pressure and Possible burn  Modifying Factors:  edema, venous stasis, lymphedema, diabetes, poor glucose control, chronic pressure, decreased mobility, shear force, obesity and arterial insufficiency  Associated Signs/Symptoms:  edema, drainage and pain        PAST MEDICAL HISTORY        Diagnosis Date    Ankle swelling     Arthritis     Breast cancer ([address] 6/15/1999    CAD (coronary artery disease) 10/13/2012    Cancer ([address]     breast    Constipation 2021    inpatient admission x 2 days    Generalized headaches     HTN (hypertension) 2015    Hyperlipidemia     OA (osteoarthritis) of knee 2015    Type II or unspecified type diabetes mellitus without mention of complication, not stated as uncontrolled        PAST SURGICAL HISTORY    Past Surgical History:   Procedure Laterality Date    BACK SURGERY  ,     BREAST SURGERY      breast biopys    [facility]    HYSTERECTOMY      partial       FAMILY HISTORY ecchymosis Absent  bilateral feet/foot. Musculoskeletal: Mild pain with debridement of wounds 5/5 muscle strength in/eversion and dorsi/plantarflexion bilateral feet. No gross instability noted. Assessment:     Patient Active Problem List   Diagnosis    Diabetes mellitus ([address]    Pure hypercholesterolemia    Bilateral carpal tunnel syndrome    Lumbar radiculopathy    Diabetic peripheral neuropathy (HCC)    CAD (coronary artery disease)    Pain medication agreement signed    OA (osteoarthritis) of knee    Chondrocalcinosis    Other specified diabetes mellitus with diabetic neuropathy    Chest pain on breathing    Abnormal ECG    HTN (hypertension)    Chest pain    Abnormal stress test    Single vessel coronary artery disease    S/P primary angioplasty with coronary stent    Palpitations    Mixed hyperlipidemia    Hip pain, bilateral    Hyperkalemia    Anemia    Constipation       Procedure Note    Performed by: Paulo Tim DPM    Consent obtained: Yes    Time out taken:  Yes    Pain Control: Anesthetic  Anesthetic: 4% Lidocaine Cream     Debridement:Excisional Debridement    Using curette, scissors and forceps the wound was sharply debrided    down through and including the removal of epidermis, dermis and subcutaneous tissue. Devitalized Tissue Debrided:  fibrin, biofilm, slough, necrotic/eschar, exudate and callus    Pre Debridement Measurements:  Are located in the Wound Documentation Flow Sheet    Wound #: 1 and 2     Wound Care Documentation:  Wound 03/02/21 #2, Right Medial Heel, Diabetic Foot Ulcer, Carbajal 1, onset 11/1/20 (Active)   Wound Image   03/19/21 0945   Wound Etiology Diabetic Carbajal 1 03/30/21 1153   Dressing Status New dressing applied;Clean;Dry; Intact 03/30/21 1153   Wound Cleansed Soap and water 03/30/21 1049   Dressing/Treatment Other (comment) 03/30/21 1153   Offloading for Diabetic Foot Ulcers Offloading boot 03/19/21 0945   Dressing Change Due 03/20/21 03/19/21 0945   Wound Length (cm) 2 cm 03/30/21 1049   Wound Width (cm) 2.8 cm 03/30/21 1049   Wound Depth (cm) 0.2 cm 03/30/21 1049   Wound Surface Area (cm^2) 5.6 cm^2 03/30/21 1049   Change in Wound Size % (l*w) -133.33 03/30/21 1049   Wound Volume (cm^3) 1.12 cm^3 03/30/21 1049   Wound Healing % -133 03/30/21 1049   Post-Procedure Length (cm) 2 cm 03/23/21 1150   Post-Procedure Width (cm) 3.3 cm 03/23/21 1150   Post-Procedure Depth (cm) 0.2 cm 03/23/21 1150   Post-Procedure Surface Area (cm^2) 6.6 cm^2 03/23/21 1150   Post-Procedure Volume (cm^3) 1.32 cm^3 03/23/21 1150   Distance Tunneling (cm) 0 cm 03/09/21 0821   Undermining Maxium Distance (cm) 0 03/09/21 0821   Wound Assessment Pink/red 03/30/21 1049   Drainage Amount Small 03/30/21 1049   Drainage Description Serosanguinous 03/30/21 1049   Odor None 03/30/21 1049   Zahida-wound Assessment Hyperkeratosis (callous) 03/30/21 1049   Margins Attached edges 03/30/21 1049   Number of days: 28         Total Surface Area Debrided: 5.6 Square centimeters into the subcutaneous of the right heel    Percentage of wound debrided 100%    Bleeding:  Minimal    Hemostasis Achieved:  by pressure    Procedural Pain:  0  / 10     Post Procedural Pain:  0 / 10     Response to treatment:  Well tolerated by patient. Plan:   Patient examined and evaluated  Wound right without incident  All questions answered to patient satisfaction  Again encouraged appropriate offloading of the heel including offloading boots. Most likely this is due to pressure or a burn  Daily dressing changes with gentamicin cream  Vascular arterial studies ordered these have still not been completed since her first visit. Reinforced a diabetic diet and taking medications appropriately she is to see her primary care later this week. Keep foot offloaded  Follow-up in 1 week  The nature of the patient's condition was explained in depth.  The patient was informed that their compliance to the treatment plan is paramount to successful healing and prevention of further ulceration and/or infection     Discharge Treatment Wound 03/02/21 #2, Right Medial Heel, Diabetic Foot Ulcer, Carbajal 1, onset 11/1/20-Dressing/Treatment: Other (comment)(triad to sebastien,gentamycin,collagen,foam,coflex TLC calamine )Daily dressing changes with compression keep legs elevated at all times and not active    Written Patient Discharge Instructions Given            Electronically signed by Saturnino Turcios DPM on 3/30/2021 at 12:08 PM

## 2021-03-30 NOTE — LETTER
lung disease. Overdose or dangerous interactions with alcohol and other medications may occur, leading to death. Hyperalgesia may develop, which means patients receiving opioids for the treatment of pain may become more sensitive to certain painful stimuli, and in some cases, experience pain from ordinarily non-painful stimuli. Women between the ages of 14-53 who could become pregnant should carefully weigh the risks and benefits of opioids with their physicians, as these medications increase the risk of pregnancy complications, including miscarriage,  delivery and stillbirth. It is also possible for babies to be born addicted to opioids. Opioid dependence withdrawal symptoms may include; feelings of uneasiness, increased pain, irritability, belly pain, diarrhea, sweats and goose-flesh. Benzodiazepines and non-benzodiazepine sleep medications: These medications can lead to problems such as addiction/dependence, sedation, fatigue, lightheadedness, dizziness, incoordination, falls, depression, hallucinations, and impaired judgment, memory and concentration. The ability to drive and operate machinery may also be affected. Abnormal sleep-related behaviors have been reported, including sleepwalking, driving, making telephone calls, eating, or having sex while not fully awake. These medications can suppress breathing and worsen sleep apnea, particularly when combined with alcohol or other sedating medications, potentially leading to death. Dependence withdrawal symptoms may include tremors, anxiety, hallucinations and seizures. Stimulants:  Common adverse effects include addiction/dependence, increased blood  pressure and heart rate, decreased appetite, nausea, involuntary weight loss, insomnia,                                                                                                                     Initials:_______   irritability, and headaches.   These risks may increase when these medications are combined with other stimulants, such as caffeine pills or energy drinks, certain weight loss supplements and oral decongestants. Dependence withdrawal symptoms may include depressed mood, loss of interest, suicidal thoughts, anxiety, fatigue, appetite changes and agitation. Testosterone replacement therapy:  Potential side effects include increased risk of stroke and heart attack, blood clots, increased blood pressure, increased cholesterol, enlarged prostate, sleep apnea, irritability/aggression and other mood disorders, and decreased fertility. I agree and understand that I and my prescriber have the following rights and responsibilities regarding my treatment plan:     1. MY RIGHTS:  To be informed of my treatment and medication plan. To be an active participant in my health and wellbeing. 2. MY RESPONSIBILITY AND UNDERSTANDING FOR USE OF MEDICATIONS   I will take medications at the dose and frequency as directed. For my safety, I will not increase or change how I take my medications without the recommendation of my healthcare provider.  I will actively participate in any program recommended by my provider which may improve function, including social, physical, psychological programs.  I will not take my medications with alcohol or other drugs not prescribed to me. I understand that drinking alcohol with my medications increases the chances of side effects, including reduced breathing rate and could lead to personal injury when operating machinery.  I understand that if I have a history of substance use disorders, including alcohol or other illicit drugs, that I may be at increased risk of addiction to my medications.  I agree to notify my provider immediately if I should become pregnant so that my treatment plan can be adjusted.    I agree and understand that I shall only receive controlled substance medications from the prescriber that signed this agreement unless there is written agreement among other prescribers of controlled substances outlining the responsibility of the medications being prescribed.  I understand that the if the controlled medication is not helping to achieve goals, the dosage may be tapered and no longer prescribed. 3. MY RESPONSIBILITY FOR COMMUNICATION / PRESCRIPTION RENEWALS   I agree that all controlled substance medications that I take will be prescribed only by my provider. If another healthcare provider prescribes me medication in an emergency, I will notify my provider within seventy-two (72) hours.  I will arrange for refills at the prescribed interval ONLY during regular office hours. I will not ask for refills earlier than agreed, after-hours, on holidays or weekends. Refills may take up to 72 hours for processing and prescriptions to reach the pharmacy.  I will inform my other health care providers that I am taking these medications and of the existence of this [id number]. In the event of an emergency, I will provide the same information to the emergency department prescribers.  I will keep my provider updated on the pharmacy I am using for controlled medication prescription filling. Initials:_______  4. MY RESPONSIBILITY FOR PROTECTING MEDICATIONS   I will protect my prescriptions and medications. I understand that lost or misplaced prescriptions will not be replaced.  I will keep medications only for my own use and will not share them with others. I will keep all medications away from children.  I agree that if my medications are adjusted or discontinued, I will properly dispose of any remaining medications. I understand that I will be required to dispose of any remaining controlled medications as, directed by my prescriber, prior to being provided with any prescriptions for other controlled medications.   Medication drop box locations can be found at: [employer]    5. MY RESPONSIBILITY WITH ILLEGAL DRUGS    I will not use illegal or street drugs or another person's prescription medications not prescribed to me.  If there are identified addiction type symptoms, then referral to a program may be provided by my provider and I agree to follow through with this recommendation. 6. MY RESPONSIBILITY FOR COOPERATION WITH INVESTIGATIONS   I understand that my provider will comply with any applicable law and may discuss my use and/or possible misuse/abuse of controlled substances and alcohol, as appropriate, with any health care provider involved in my care, pharmacist, or legal authority.  I authorize my provider and pharmacy to cooperate fully with law enforcement agencies (as permitted by law) in the investigation of any possible misuse, sale, or other diversion of my controlled substances.  I agree to waive any applicable privilege or right of privacy or confidentiality with respect to these authorizations. 7. PROVIDERS RIGHT TO MONITOR FOR SAFETY: PRESCRIPTION MONITORING / DRUG TESTING   I consent to drug/toxicology screening and will submit to a drug screen upon my providers request to assure I am only taking the prescribed drugs for my safety monitoring. I understand that a drug screen is a laboratory test in which a sample of my urine, blood or saliva is checked to see what drugs I have been taking. This may entail an observed urine specimen, which means that a nurse or other health care provider may watch me provide urine, and I will cooperate if I am asked to provide an observed specimen.  I understand that my provider will check a copy of my State Prescription Monitoring Program () Report in order to safely prescribe medications.  Pill Counts: I consent to pill counts when requested.   I may be asked to bring all my prescribed controlled substance medications, in their original bottles, to all of my scheduled appointments. In addition, my provider may ask me to come to the practice at any time for a random pill count. 8. TERMINATION OF THIS AGREEMENT  For my safety, my prescriber has the right to stop prescribing controlled substance medications and may end this agreement. Initials:_______   Conditions that may result in termination of this agreement:  a. I do not show any improvement in pain, or my activity has not improved. b. I develop rapid tolerance or loss of improvement, as described in my treatment plan.  c. I develop significant side effects from the medication. d. My behavior is not consistent with the responsibilities outlined above, thereby causing safety concerns to continue prescribing controlled substance medications. e. I fail to follow the terms of this agreement. f. Other:____________________________       UNDERSTANDING THIS MEDICATION AGREEMENT:    I have read the above and have had all my questions answered. For chronic disease management, I know that my symptoms can be managed with many types of treatments. A chronic medication trial may be part of my treatment, but I must be an active participant in my care. Medication therapy is only one part of my symptom management plan. In some cases, there may be limited scientific evidence to support the chronic use of certain medications to improve symptoms and daily function. Furthermore, in certain circumstances, there may be scientific information that suggests that the use of chronic controlled substances may worsen my symptoms and increase my risk of unintentional death directly related to this medication therapy. I know that if my provider feels my risk from controlled medications is greater than my benefit, I will have my controlled substance medication(s) compassionately lowered or removed altogether.      I further agree to allow this office to contact my HIPAA contact if there are concerns about my safety and use of the controlled medications. I have agreed to use the prescribed controlled substance medications to me as instructed by my provider and as stated in this Medication Agreement. My initial on each page and my signature below shows that I have read each page and I have had the opportunity to ask questions with answers provided by my provider.     Patient Name (Printed): _____________________________________  Patient Signature:  ______________________   Date: _____________    Prescriber Name (Printed): ___________________________________  Prescriber Signature: _____________________  Date: _____________

## 2021-04-03 ASSESSMENT — ENCOUNTER SYMPTOMS
DIARRHEA: 0
NAUSEA: 0
VOMITING: 0
CONSTIPATION: 1
CHEST TIGHTNESS: 0
COUGH: 0

## 2021-04-06 ENCOUNTER — HOSPITAL ENCOUNTER (OUTPATIENT)
Dept: VASCULAR LAB | Age: 81
Discharge: HOME | End: 2021-04-06
Payer: MEDICARE

## 2021-04-06 ENCOUNTER — HOSPITAL ENCOUNTER (OUTPATIENT)
Dept: WOUND CARE | Age: 81
Discharge: HOME | End: 2021-04-06
Payer: MEDICARE

## 2021-04-06 VITALS
DIASTOLIC BLOOD PRESSURE: 58 MMHG | WEIGHT: 159.2 LBS | SYSTOLIC BLOOD PRESSURE: 98 MMHG | HEIGHT: 61 IN | HEART RATE: 59 BPM | TEMPERATURE: 98.5 F | RESPIRATION RATE: 18 BRPM | BODY MASS INDEX: 30.06 KG/M2

## 2021-04-06 DIAGNOSIS — L97.412 ULCER OF RIGHT HEEL, WITH FAT LAYER EXPOSED (HCC): ICD-10-CM

## 2021-04-06 DIAGNOSIS — I73.89 OTHER SPECIFIED PERIPHERAL VASCULAR DISEASES (HCC): ICD-10-CM

## 2021-04-06 PROCEDURE — 11042 DBRDMT SUBQ TIS 1ST 20SQCM/<: CPT

## 2021-04-06 PROCEDURE — 93925 LOWER EXTREMITY STUDY: CPT

## 2021-04-06 PROCEDURE — 93880 EXTRACRANIAL BILAT STUDY: CPT

## 2021-04-06 RX ORDER — LIDOCAINE 40 MG/G
CREAM TOPICAL ONCE
Status: DISCONTINUED | OUTPATIENT
Start: 2021-04-06 | End: 2021-04-07 | Stop reason: HOSPADM

## 2021-04-06 NOTE — PROGRESS NOTES
[address] [facility]  Progress Note and Procedure Note      Rosa Mora  AGE: 79 y.o. GENDER: female  : 1940  TODAY'S DATE:  2021    Subjective:     Chief Complaint   Patient presents with    Wound Check         HISTORY of PRESENT ILLNESS HPI     Rosa Mora is a 79 y.o. female who presents today for wound evaluation. History of Wound: Patient notes wound is been present for at least a month on the right and for 2 weeks on the left. She admits to being diabetic for greater than 40 years. Admits to on and off pain in her right foot. She has not tried the lidocaine patch yet. She has her vascular study scheduled for this afternoon. Getting of the bandage changed with help from her friend they took off the multilayer compression wrap because she felt it was too tight.     Wound Pain:  intermittent  Severity:  2 / 10   Wound Type:  diabetic, pressure and Possible burn  Modifying Factors:  edema, venous stasis, lymphedema, diabetes, poor glucose control, chronic pressure, decreased mobility, shear force, obesity and arterial insufficiency  Associated Signs/Symptoms:  edema, drainage and pain        PAST MEDICAL HISTORY        Diagnosis Date    Ankle swelling     Arthritis     Breast cancer ([address] 6/15/1999    CAD (coronary artery disease) 10/13/2012    Cancer ([address]     breast    Constipation 2021    inpatient admission x 2 days    Generalized headaches     HTN (hypertension) 2015    Hyperlipidemia     OA (osteoarthritis) of knee 2015    Type II or unspecified type diabetes mellitus without mention of complication, not stated as uncontrolled        PAST SURGICAL HISTORY    Past Surgical History:   Procedure Laterality Date    BACK SURGERY  ,     BREAST SURGERY      breast biopys    [place]    HYSTERECTOMY      partial       FAMILY HISTORY    Family History   Problem Relation Age of Onset    Heart on exam bilateral feet/ankles. Derm: Right heel, left heel remains healed. ecchymosis Absent  bilateral feet/foot. Musculoskeletal: Mild pain with debridement of wounds 5/5 muscle strength in/eversion and dorsi/plantarflexion bilateral feet. No gross instability noted. Assessment:     Patient Active Problem List   Diagnosis    [facility]    Pure hypercholesterolemia    Bilateral carpal tunnel syndrome    Lumbar radiculopathy    Diabetic peripheral neuropathy (HCC)    CAD (coronary artery disease)    Pain medication agreement signed    OA (osteoarthritis) of knee    Chondrocalcinosis    Other specified diabetes mellitus with diabetic neuropathy    Chest pain on breathing    Abnormal ECG    HTN (hypertension)    Chest pain    Abnormal stress test    Single vessel coronary artery disease    S/P primary angioplasty with coronary stent    Palpitations    Mixed hyperlipidemia    Hip pain, bilateral    Hyperkalemia    Anemia    Constipation       Procedure Note    Performed by: Moris Hatfield DPM    Consent obtained: Yes    Time out taken:  Yes    Pain Control: Anesthetic  Anesthetic: 4% Lidocaine Cream     Debridement:Excisional Debridement    Using curette, scissors and forceps the wound was sharply debrided    down through and including the removal of epidermis, dermis and subcutaneous tissue. Devitalized Tissue Debrided:  fibrin, biofilm, slough, necrotic/eschar, exudate and callus    Pre Debridement Measurements:  Are located in the Wound Documentation Flow Sheet    Wound #: 1 and 2   Wound Care Documentation:  Wound 03/02/21 #2, Right Medial Heel, Diabetic Foot Ulcer, Carbajal 1, onset 11/1/20 (Active)   Wound Image   03/19/21 0945   Wound Etiology Diabetic Carbajal 1 04/06/21 1106   Dressing Status New dressing applied;Clean;Dry; Intact 03/30/21 1153   Wound Cleansed Irrigated with saline; Soap and water 04/06/21 1106   Dressing/Treatment Other (comment) 03/30/21 [facility] Diabetic Foot Ulcers Offloading boot 03/19/21 0945   Dressing Change Due 03/20/21 03/19/21 0945   Wound Length (cm) 1.4 cm 04/06/21 1106   Wound Width (cm) 1.4 cm 04/06/21 1106   Wound Depth (cm) 0.3 cm 04/06/21 1106   Wound Surface Area (cm^2) 1.96 cm^2 04/06/21 1106   Change in Wound Size % (l*w) 18.33 04/06/21 1106   Wound Volume (cm^3) 0.59 cm^3 04/06/21 1106   Wound Healing % -23 04/06/21 1106   Post-Procedure Length (cm) 1.4 cm 04/06/21 1158   Post-Procedure Width (cm) 1.4 cm 04/06/21 1158   Post-Procedure Depth (cm) 0.3 cm 04/06/21 1158   Post-Procedure Surface Area (cm^2) 1.96 cm^2 04/06/21 1158   Post-Procedure Volume (cm^3) 0.59 cm^3 04/06/21 1158   Distance Tunneling (cm) 0 cm 03/09/21 0821   Undermining Maxium Distance (cm) 0 03/09/21 0821   Wound Assessment Pink/red 04/06/21 1106   Drainage Amount Small 04/06/21 1106   Drainage Description Serosanguinous 04/06/21 1106   Odor None 04/06/21 1106   Zahida-wound Assessment Hyperkeratosis (callous) 04/06/21 1106   Margins Attached edges 04/06/21 1106   Number of days: 35         Total Surface Area Debrided: 1.96 Square centimeters into the subcutaneous of the right heel    Percentage of wound debrided 100%    Bleeding:  Minimal    Hemostasis Achieved:  by pressure    Procedural Pain:  0  / 10     Post Procedural Pain:  0 / 10     Response to treatment:  Well tolerated by patient. Plan:   Patient examined and evaluated  Wound right without incident  All questions answered to patient satisfaction  Again encouraged appropriate offloading of the heel including offloading boots. Most likely this is due to pressure or a burn  Daily dressing changes with triad  Vascular arterial study scheduled for today  Reinforced a diabetic diet and taking medications appropriately she is to see her primary care later this week. Keep foot offloaded  Follow-up in 1 week  The nature of the patient's condition was explained in depth.  The patient was informed that their compliance to the treatment plan is paramount to successful healing and prevention of further ulceration and/or infection     Discharge Treatment  Daily dressing changes with compression keep legs elevated at all times and not active    Written Patient Discharge Instructions Given            Electronically signed by Michael Nation DPM on 4/6/2021 at 12:09 PM

## 2021-04-13 ENCOUNTER — HOSPITAL ENCOUNTER (OUTPATIENT)
Dept: WOUND CARE | Age: 81
Discharge: HOME | End: 2021-04-13
Payer: MEDICARE

## 2021-04-13 VITALS
WEIGHT: 160 LBS | SYSTOLIC BLOOD PRESSURE: 128 MMHG | TEMPERATURE: 98 F | BODY MASS INDEX: 30.21 KG/M2 | HEIGHT: 61 IN | DIASTOLIC BLOOD PRESSURE: 79 MMHG | RESPIRATION RATE: 18 BRPM | HEART RATE: 62 BPM

## 2021-04-13 PROCEDURE — 11042 DBRDMT SUBQ TIS 1ST 20SQCM/<: CPT

## 2021-04-13 RX ORDER — LIDOCAINE 40 MG/G
CREAM TOPICAL ONCE
Status: DISCONTINUED | OUTPATIENT
Start: 2021-04-13 | End: 2021-04-14 | Stop reason: HOSPADM

## 2021-04-13 NOTE — PLAN OF CARE
Pt seen in [address] [place] [address],[address] - Right heel wound improving slowing - debride per Dr. Chuck Taveras - discussed results of arterial studies - no further vascular intervention needed at this time - will cont current treatment with triad only to open wound area and not surrounding callused areas - pt also needs to be more compliant with elevation and wearing spandigrip to decrease swelling - reviewed AVS - F/u in 1 week

## 2021-04-13 NOTE — PROGRESS NOTES
[facility]  Progress Note and Procedure Note      Rosa Mora  AGE: 79 y.o. GENDER: female  : 1940  TODAY'S DATE:  2021    Subjective:     Chief Complaint   Patient presents with    Wound Check         HISTORY of PRESENT ILLNESS HPI     Rosa Mora is a 79 y.o. female who presents today for wound evaluation. History of Wound: Patient notes wound is been present for at least a month on the right and for 2 weeks on the left. She admits to being diabetic for greater than 40 years. She has been try to keep pressure off the foot. She does not like to wear the compression stockings. She is trying to keep her legs elevated when not active.   Wound Pain:  intermittent  Severity:  2 / 10   Wound Type:  diabetic, pressure and Possible burn  Modifying Factors:  edema, venous stasis, lymphedema, diabetes, poor glucose control, chronic pressure, decreased mobility, shear force, obesity and arterial insufficiency  Associated Signs/Symptoms:  edema, drainage and pain        PAST MEDICAL HISTORY        Diagnosis Date    Ankle swelling     Arthritis     Breast cancer ([address] 6/15/1999    CAD (coronary artery disease) 10/13/2012    Cancer ([address]     breast    Constipation 2021    inpatient admission x 2 days    Generalized headaches     HTN (hypertension) 2015    Hyperlipidemia     OA (osteoarthritis) of knee 2015    Type II or unspecified type diabetes mellitus without mention of complication, not stated as uncontrolled        PAST SURGICAL HISTORY    Past Surgical History:   Procedure Laterality Date    BACK SURGERY  ,     BREAST SURGERY      breast biopys    CARPAL TUNNEL RELEASE Left 2017    [place]    HYSTERECTOMY      partial       FAMILY HISTORY    Family History   Problem Relation Age of Onset    Heart Disease Brother     Hearing Loss Mother     High Blood Pressure Mother        SOCIAL HISTORY    Social History     Tobacco Use  Smoking status: Never Smoker    Smokeless tobacco: Never Used    Tobacco comment: 3/2/21   Substance Use Topics    Alcohol use: No     Alcohol/week: 0.0 standard drinks    Drug use: No       ALLERGIES    Allergies   Allergen Reactions    Alendronate Sodium Anaphylaxis    Influenza Vaccines     Pneumococcal Vaccines Other (See Comments)    Rofecoxib Swelling    Vioxx     Sulfa Antibiotics Rash       MEDICATIONS    Current Outpatient Medications on File Prior to Encounter   Medication Sig Dispense Refill    acetaminophen (TYLENOL) 325 MG tablet Take 650 mg by mouth every 6 hours as needed for Pain      pregabalin (LYRICA) 75 MG capsule Take 1 capsule by mouth 3 times daily for 30 days.  90 capsule 0    bisacodyl (DULCOLAX) 5 MG EC tablet Take 1 tablet by mouth daily 30 tablet 0    polyethylene glycol (GLYCOLAX) 17 g packet Take 17 g by mouth daily as needed for Constipation 527 g 0    insulin glargine (LANTUS SOLOSTAR) 100 UNIT/ML injection pen Inject 15 Units into the skin nightly (Patient taking differently: Inject 30 Units into the skin nightly ) 5 pen 1    magnesium oxide (MAG-OX) 400 MG tablet Take 400 mg by mouth daily      ferrous sulfate (IRON 325) 325 (65 Fe) MG tablet Take 325 mg by mouth daily (with breakfast)      rosuvastatin (CRESTOR) 40 MG tablet Take 40 mg by mouth every evening      baclofen (LIORESAL) 10 MG tablet Take 10 mg by mouth 2 times daily as needed      clopidogrel (PLAVIX) 75 MG tablet TAKE 1 TABLET EVERY DAY 90 tablet 0    metoprolol succinate (TOPROL XL) 25 MG extended release tablet Take 1 tablet by mouth daily (Patient taking differently: Take 50 mg by mouth 2 times daily ) 90 tablet 1    furosemide (LASIX) 40 MG tablet Take 1 tablet by mouth daily 90 tablet 2    aspirin 81 MG tablet Take 1 tablet by mouth daily 30 tablet 3    docusate sodium (COLACE) 100 MG capsule Take 1 capsule by mouth 2 times daily 60 capsule 0    Insulin Pen Needle ([employer] PEN NEEDLES 29G) 29G X 12MM MISC 1 each by Does not apply route daily 100 each 3    Blood Glucose Monitoring Suppl (BLOOD GLUCOSE SYSTEM NOEL) KIT Use as directed to test blood sugar three times a day  E11.9 1 kit 0    blood glucose test strips (ASCENSIA AUTODISC VI;ONE TOUCH ULTRA TEST VI) strip 1 each by In Vitro route 2 times daily As needed. 100 each 5    Lancets MISC Use as directed to test blood sugar three times a day E11.9 100 each 5    gentamicin (GARAMYCIN) 0.1 % cream Apply topically daily wtih dressing changes. 1 Tube 3    blood glucose monitor strips Use as directed to test blood sugar three times a day E11.9 100 strip 3    Blood Glucose Monitoring Suppl (CONTOUR NEXT ONE) KIT Test blood sugar twice a day 1 kit 1     No current facility-administered medications on file prior to encounter. REVIEW OF SYSTEMS    Pertinent items are noted in HPI. Objective:      /79   Pulse 62   Temp 98 °F (36.7 °C) (Oral)   Resp 18   Ht 5' 1\" (1.549 m)   Wt 160 lb (72.6 kg)   BMI 30.23 kg/m²     PHYSICAL EXAM    Vascular: Vascular status Intact  palpable pedal pulses, right DP1/4 and PT1/4, left DP1/4 and PT1/4. Hair growthAbsent  both lower extremities and feet. Skin temperature is warm to cool from pretibial area to distal digits bilateral.  Exam is negative for rubor, pallor, cyanosis or signs of acute vascular compromise bilaterally. Exam is positive for edema bilateral lower extremity. Varicosities Present bilateral lower extremity. Neuro: Neurologic status diminished bilateral with epicritic diminished, proprioceptive Absent , vibratory sensationAbsent  and protopathic Present. DTRs Present bilateral Achilles. There were no reproducible neuritic symptoms on exam bilateral feet/ankles. Derm: Right heel, left heel remains healed. ecchymosis Absent  bilateral feet/foot.     Musculoskeletal: Mild pain with debridement of wounds 5/5 muscle strength in/eversion and dorsi/plantarflexion bilateral infection     Discharge Treatment Wound 03/02/21 #2, Right Medial Heel, Diabetic Foot Ulcer, Carbajal 1, onset 11/1/20-Dressing/Treatment: (triad, fluffed 4x4, kerlix, spandadrip)Daily dressing changes with compression keep legs elevated at all times and not active    Written Patient Discharge Instructions Given            Electronically signed by Louise Hernandez DPM on 4/13/2021 at 12:12 PM

## 2021-04-20 ENCOUNTER — HOSPITAL ENCOUNTER (OUTPATIENT)
Dept: WOUND CARE | Age: 81
Discharge: HOME | End: 2021-04-20
Payer: MEDICARE

## 2021-04-20 VITALS
TEMPERATURE: 99.8 F | DIASTOLIC BLOOD PRESSURE: 59 MMHG | HEART RATE: 67 BPM | RESPIRATION RATE: 21 BRPM | SYSTOLIC BLOOD PRESSURE: 141 MMHG | BODY MASS INDEX: 29.94 KG/M2 | HEIGHT: 61 IN | WEIGHT: 158.6 LBS

## 2021-04-20 PROCEDURE — 11042 DBRDMT SUBQ TIS 1ST 20SQCM/<: CPT

## 2021-04-20 RX ORDER — LIDOCAINE 40 MG/G
CREAM TOPICAL ONCE
Status: DISCONTINUED | OUTPATIENT
Start: 2021-04-20 | End: 2021-04-21 | Stop reason: HOSPADM

## 2021-04-20 NOTE — PROGRESS NOTES
[facility]  Progress Note and Procedure Note      Rosa Mora  AGE: 79 y.o. GENDER: female  : 1940  TODAY'S DATE:  2021    Subjective:     Chief Complaint   Patient presents with    Wound Check         HISTORY of PRESENT ILLNESS HPI     Rosa Mora is a 79 y.o. female who presents today for wound evaluation. History of Wound: Patient notes wound is been present for at least a month on the right and for 2 weeks on the left. She admits to being diabetic for greater than 40 years. She has been changing the bandage as directed. She notes the wound does look smaller. She denies any current pain. She is currently happy with the outcome of her treatment. She states she is leaving at the end of the month to go back to [place].   Wound Pain:  intermittent  Severity:  2 / 10   Wound Type:  diabetic, pressure and Possible burn  Modifying Factors:  edema, venous stasis, lymphedema, diabetes, poor glucose control, chronic pressure, decreased mobility, shear force, obesity and arterial insufficiency  Associated Signs/Symptoms:  edema, drainage and pain        PAST MEDICAL HISTORY        Diagnosis Date    Ankle swelling     Arthritis     Breast cancer ([address] 6/15/1999    CAD (coronary artery disease) 10/13/2012    Cancer ([address]     breast    Constipation 2021    inpatient admission x 2 days    Generalized headaches     HTN (hypertension) 2015    Hyperlipidemia     OA (osteoarthritis) of knee 2015    Type II or unspecified type diabetes mellitus without mention of complication, not stated as uncontrolled        PAST SURGICAL HISTORY    Past Surgical History:   Procedure Laterality Date    BACK SURGERY  ,     BREAST SURGERY      breast biopys    CARPAL TUNNEL RELEASE Left 2017    [place]    HYSTERECTOMY      partial       FAMILY HISTORY    Family History   Problem Relation Age of Onset    Heart Disease Brother     Hearing Loss Mother     High Blood Pressure Mother        SOCIAL HISTORY    Social History     Tobacco Use    Smoking status: Never Smoker    Smokeless tobacco: Never Used    Tobacco comment: 3/2/21   Substance Use Topics    Alcohol use: No     Alcohol/week: 0.0 standard drinks    Drug use: No       ALLERGIES    Allergies   Allergen Reactions    Alendronate Sodium Anaphylaxis    Influenza Vaccines     Pneumococcal Vaccines Other (See Comments)    Rofecoxib Swelling    Vioxx     Sulfa Antibiotics Rash       MEDICATIONS    Current Outpatient Medications on File Prior to Encounter   Medication Sig Dispense Refill    acetaminophen (TYLENOL) 325 MG tablet Take 650 mg by mouth every 6 hours as needed for Pain      pregabalin (LYRICA) 75 MG capsule Take 1 capsule by mouth 3 times daily for 30 days. 90 capsule 0    bisacodyl (DULCOLAX) 5 MG EC tablet Take 1 tablet by mouth daily 30 tablet 0    polyethylene glycol (GLYCOLAX) 17 g packet Take 17 g by mouth daily as needed for Constipation 527 g 0    Insulin Pen Needle ([employer] PEN NEEDLES 29G) 29G X 12MM MISC 1 each by Does not apply route daily 100 each 3    insulin glargine (LANTUS SOLOSTAR) 100 UNIT/ML injection pen Inject 15 Units into the skin nightly (Patient taking differently: Inject 30 Units into the skin nightly ) 5 pen 1    Blood Glucose Monitoring Suppl (BLOOD GLUCOSE SYSTEM NOEL) KIT Use as directed to test blood sugar three times a day  E11.9 1 kit 0    blood glucose test strips (ASCENSIA AUTODISC VI;ONE TOUCH ULTRA TEST VI) strip 1 each by In Vitro route 2 times daily As needed.  100 each 5    Lancets MISC Use as directed to test blood sugar three times a day E11.9 100 each 5    magnesium oxide (MAG-OX) 400 MG tablet Take 400 mg by mouth daily      ferrous sulfate (IRON 325) 325 (65 Fe) MG tablet Take 325 mg by mouth daily (with breakfast)      rosuvastatin (CRESTOR) 40 MG tablet Take 40 mg by mouth every evening      baclofen (LIORESAL) 10 MG tablet Take 10 mg by mouth 2 times daily as needed      clopidogrel (PLAVIX) 75 MG tablet TAKE 1 TABLET EVERY DAY 90 tablet 0    blood glucose monitor strips Use as directed to test blood sugar three times a day E11.9 100 strip 3    metoprolol succinate (TOPROL XL) 25 MG extended release tablet Take 1 tablet by mouth daily (Patient taking differently: Take 50 mg by mouth 2 times daily ) 90 tablet 1    furosemide (LASIX) 40 MG tablet Take 1 tablet by mouth daily 90 tablet 2    Blood Glucose Monitoring Suppl (CONTOUR NEXT ONE) KIT Test blood sugar twice a day 1 kit 1    aspirin 81 MG tablet Take 1 tablet by mouth daily 30 tablet 3     No current facility-administered medications on file prior to encounter. REVIEW OF SYSTEMS    Pertinent items are noted in HPI. Objective:      BP (!) 141/59   Pulse 67   Temp 99.8 °F (37.7 °C) (Oral)   Resp 21   Ht 5' 1\" (1.549 m)   Wt 158 lb 9.6 oz (71.9 kg)   BMI 29.97 kg/m²     PHYSICAL EXAM    Vascular: Vascular status Intact  palpable pedal pulses, right DP1/4 and PT1/4, left DP1/4 and PT1/4. Hair growthAbsent  both lower extremities and feet. Skin temperature is warm to cool from pretibial area to distal digits bilateral.  Exam is negative for rubor, pallor, cyanosis or signs of acute vascular compromise bilaterally. Exam is positive for edema bilateral lower extremity. Varicosities Present bilateral lower extremity. Neuro: Neurologic status diminished bilateral with epicritic diminished, proprioceptive Absent , vibratory sensationAbsent  and protopathic Present. DTRs Present bilateral Achilles. There were no reproducible neuritic symptoms on exam bilateral feet/ankles. Derm: Right heel, left heel remains healed. ecchymosis Absent  bilateral feet/foot. Musculoskeletal: Mild pain with debridement of wounds 5/5 muscle strength in/eversion and dorsi/plantarflexion bilateral feet. No gross instability noted.       Assessment:     Patient Active Problem List   Diagnosis    [facility] [address]    Pure hypercholesterolemia    Bilateral carpal tunnel syndrome    Lumbar radiculopathy    Diabetic peripheral neuropathy (HCC)    CAD (coronary artery disease)    Pain medication agreement signed    OA (osteoarthritis) of knee    Chondrocalcinosis    Other specified diabetes mellitus with diabetic neuropathy    Chest pain on breathing    Abnormal ECG    HTN (hypertension)    Chest pain    Abnormal stress test    Single vessel coronary artery disease    S/P primary angioplasty with coronary stent    Palpitations    Mixed hyperlipidemia    Hip pain, bilateral    Hyperkalemia    Anemia    Constipation       Procedure Note    Performed by: Mynor Chavez DPM    Consent obtained: Yes    Time out taken:  Yes    Pain Control: Anesthetic  Anesthetic: 4% Lidocaine Cream     Debridement:Excisional Debridement    Using curette, scissors and forceps the wound was sharply debrided    down through and including the removal of epidermis, dermis and subcutaneous tissue. Devitalized Tissue Debrided:  fibrin, biofilm, slough, necrotic/eschar, exudate and callus    Pre Debridement Measurements:  Are located in the Wound Documentation Flow Sheet    Wound #: 1 and 2   Wound Care Documentation:  Wound 03/02/21 #2, Right Medial Heel, Diabetic Foot Ulcer, Carbajal 1, onset 11/1/20 (Active)   Wound Image   04/13/21 1043   Wound Etiology Diabetic Carbajal 1 04/20/21 1027   Dressing Status New dressing applied 04/20/21 1128   Wound Cleansed Irrigated with saline 04/20/21 1128   Dressing/Treatment Other (comment) 04/20/21 1128   Offloading for Diabetic Foot Ulcers Yes (type); Offloading boot 03/16/21 1204   Wound Length (cm) 0.5 cm 04/20/21 1027   Wound Width (cm) 0.6 cm 04/20/21 1027   Wound Depth (cm) 0.2 cm 04/20/21 1027   Wound Surface Area (cm^2) 0.3 cm^2 04/20/21 1027   Change in Wound Size % (l*w) 87.5 04/20/21 1027   Wound Volume (cm^3) 0.06 cm^3 04/20/21 1027 Wound Healing % 88 04/20/21 1027   Post-Procedure Length (cm) 0.5 cm 04/20/21 1118   Post-Procedure Width (cm) 0.6 cm 04/20/21 1118   Post-Procedure Depth (cm) 0.2 cm 04/20/21 1118   Post-Procedure Surface Area (cm^2) 0.3 cm^2 04/20/21 1118   Post-Procedure Volume (cm^3) 0.06 cm^3 04/20/21 1118   Distance Tunneling (cm) 0 cm 04/13/21 1043   Undermining Maxium Distance (cm) 0 04/13/21 1043   Wound Assessment Pink/red; Other (Comment) 04/20/21 1027   Drainage Amount Scant 04/20/21 1027   Drainage Description Serosanguinous 04/20/21 1027   Odor None 04/20/21 1027   Zahida-wound Assessment Hyperkeratosis (callous) 04/20/21 1027   Margins Attached edges 04/20/21 1027   Number of days: 49         Total Surface Area Debrided: 0.3 Square centimeters into the subcutaneous of the right heel    Percentage of wound debrided 100%    Bleeding:  Minimal    Hemostasis Achieved:  by pressure    Procedural Pain:  0  / 10     Post Procedural Pain:  0 / 10     Response to treatment:  Well tolerated by patient. Plan:   Patient examined and evaluated  Wound right without incident  Wound continuing to heal  2% improvement since last week  Daily dressing changes with triad  Vascular studies completed and have some signs of mild arterial disease if the wound continues to heal appropriately we will not need to consult vascular surgery. Reinforced a diabetic diet and taking medications appropriately she is to see her primary care later this week. Keep foot offloaded  Follow-up in 1 week  The nature of the patient's condition was explained in depth. The patient was informed that their compliance to the treatment plan is paramount to successful healing and prevention of further ulceration and/or infection     Discharge Treatment Wound 03/02/21 #2, Right Medial Heel, Diabetic Foot Ulcer, Carbajal 1, onset 11/1/20-Dressing/Treatment: Other (comment)(Triad,fluffed gauze,sherice,med comp.  Spandagrip)Daily dressing changes with compression keep legs elevated at all times and not active    Written Patient Discharge Instructions Given            Electronically signed by Inna Pruett DPM on 4/20/2021 at 12:15 PM

## 2021-04-23 ENCOUNTER — TELEPHONE (OUTPATIENT)
Dept: FAMILY MEDICINE CLINIC | Age: 81
End: 2021-04-23

## 2021-04-23 DIAGNOSIS — L97.509 DIABETIC FOOT ULCER ASSOCIATED WITH TYPE 2 DIABETES MELLITUS, UNSPECIFIED LATERALITY, UNSPECIFIED PART OF FOOT, UNSPECIFIED ULCER STAGE (HCC): ICD-10-CM

## 2021-04-23 DIAGNOSIS — E11.621 DIABETIC FOOT ULCER ASSOCIATED WITH TYPE 2 DIABETES MELLITUS, UNSPECIFIED LATERALITY, UNSPECIFIED PART OF FOOT, UNSPECIFIED ULCER STAGE (HCC): ICD-10-CM

## 2021-04-23 RX ORDER — INSULIN GLARGINE 100 [IU]/ML
30 INJECTION, SOLUTION SUBCUTANEOUS NIGHTLY
Qty: 5 PEN | Refills: 5 | Status: SHIPPED | OUTPATIENT
Start: 2021-04-23 | End: 2021-05-23

## 2021-04-23 RX ORDER — INSULIN GLARGINE 100 [IU]/ML
30 INJECTION, SOLUTION SUBCUTANEOUS NIGHTLY
Qty: 3 PEN | Refills: 3 | Status: CANCELLED | OUTPATIENT
Start: 2021-04-23

## 2021-04-27 ENCOUNTER — HOSPITAL ENCOUNTER (OUTPATIENT)
Dept: WOUND CARE | Age: 81
Discharge: HOME | End: 2021-04-27
Payer: MEDICARE

## 2021-04-27 VITALS
SYSTOLIC BLOOD PRESSURE: 162 MMHG | HEART RATE: 70 BPM | TEMPERATURE: 99.9 F | BODY MASS INDEX: 30.58 KG/M2 | RESPIRATION RATE: 20 BRPM | HEIGHT: 61 IN | DIASTOLIC BLOOD PRESSURE: 87 MMHG | WEIGHT: 162 LBS

## 2021-04-27 PROCEDURE — 11042 DBRDMT SUBQ TIS 1ST 20SQCM/<: CPT

## 2021-04-27 RX ORDER — LIDOCAINE 40 MG/G
CREAM TOPICAL ONCE
Status: DISCONTINUED | OUTPATIENT
Start: 2021-04-27 | End: 2021-04-28 | Stop reason: HOSPADM

## 2021-04-27 NOTE — PLAN OF CARE
Pt seen in [facility] - wound cont to improve to R heel - debrided per Dr. Blanca Moe- cont triad to wound - stressed off loading  and compression with spandigrip- pt rep[orts that she will be moving back to [place] next week - pt encouraged to F/U in [facility] in [place].

## 2021-04-27 NOTE — PROGRESS NOTES
[facility]  Progress Note and Procedure Note      Rosa Mora  AGE: 79 y.o. GENDER: female  : 1940  TODAY'S DATE:  2021    Subjective:     Chief Complaint   Patient presents with    Wound Check         HISTORY of PRESENT ILLNESS HPI     Rosa Mora is a 79 y.o. female who presents today for wound evaluation. History of Wound: Patient notes wound is been present for at least a month on the right and for 2 weeks on the left. She admits to being diabetic for greater than 40 years. She has been changing the bandage as directed. She notes the wound does look smaller. She denies any current pain. She is currently happy with the outcome of her treatment. She states she is leaving at the end of the month to go back to [place].   Wound Pain:  intermittent  Severity:  2 / 10   Wound Type:  diabetic, pressure and Possible burn  Modifying Factors:  edema, venous stasis, lymphedema, diabetes, poor glucose control, chronic pressure, decreased mobility, shear force, obesity and arterial insufficiency  Associated Signs/Symptoms:  edema, drainage and pain        PAST MEDICAL HISTORY        Diagnosis Date    Ankle swelling     Arthritis     Breast cancer ([address] 6/15/1999    CAD (coronary artery disease) 10/13/2012    Cancer ([address]     breast    Constipation 2021    inpatient admission x 2 days    Generalized headaches     HTN (hypertension) 2015    Hyperlipidemia     OA (osteoarthritis) of knee 2015    Type II or unspecified type diabetes mellitus without mention of complication, not stated as uncontrolled        PAST SURGICAL HISTORY    Past Surgical History:   Procedure Laterality Date    BACK SURGERY  ,     BREAST SURGERY      breast biopys    [place]EL [place]    HYSTERECTOMY      partial       FAMILY HISTORY    Family History   Problem Relation Age of Onset    Heart Disease Brother     Hearing Loss Mother     High Blood Pressure Mother        SOCIAL HISTORY    Social History     Tobacco Use    Smoking status: Never Smoker    Smokeless tobacco: Never Used    Tobacco comment: 3/2/21   Substance Use Topics    Alcohol use: No     Alcohol/week: 0.0 standard drinks    Drug use: No       ALLERGIES    Allergies   Allergen Reactions    Alendronate Sodium Anaphylaxis    Influenza Vaccines     Pneumococcal Vaccines Other (See Comments)    Rofecoxib Swelling    Vioxx     Sulfa Antibiotics Rash       MEDICATIONS    Current Outpatient Medications on File Prior to Encounter   Medication Sig Dispense Refill    insulin glargine (LANTUS SOLOSTAR) 100 UNIT/ML injection pen Inject 30 Units into the skin nightly 5 pen 5    acetaminophen (TYLENOL) 325 MG tablet Take 650 mg by mouth every 6 hours as needed for Pain      pregabalin (LYRICA) 75 MG capsule Take 1 capsule by mouth 3 times daily for 30 days.  90 capsule 0    bisacodyl (DULCOLAX) 5 MG EC tablet Take 1 tablet by mouth daily 30 tablet 0    polyethylene glycol (GLYCOLAX) 17 g packet Take 17 g by mouth daily as needed for Constipation 527 g 0    magnesium oxide (MAG-OX) 400 MG tablet Take 400 mg by mouth daily      ferrous sulfate (IRON 325) 325 (65 Fe) MG tablet Take 325 mg by mouth daily (with breakfast)      rosuvastatin (CRESTOR) 40 MG tablet Take 40 mg by mouth every evening      baclofen (LIORESAL) 10 MG tablet Take 10 mg by mouth 2 times daily as needed      clopidogrel (PLAVIX) 75 MG tablet TAKE 1 TABLET EVERY DAY 90 tablet 0    metoprolol succinate (TOPROL XL) 25 MG extended release tablet Take 1 tablet by mouth daily (Patient taking differently: Take 50 mg by mouth daily ) 90 tablet 1    furosemide (LASIX) 40 MG tablet Take 1 tablet by mouth daily 90 tablet 2    aspirin 81 MG tablet Take 1 tablet by mouth daily 30 tablet 3    Insulin Pen Needle ([employer] PEN NEEDLES 29G) 29G X 12MM MISC 1 each by Does not apply route daily 100 each 3    Blood carpal tunnel syndrome    Lumbar radiculopathy    Diabetic peripheral neuropathy (HCC)    CAD (coronary artery disease)    Pain medication agreement signed    OA (osteoarthritis) of knee    Chondrocalcinosis    Other specified diabetes mellitus with diabetic neuropathy    Chest pain on breathing    Abnormal ECG    HTN (hypertension)    Chest pain    Abnormal stress test    Single vessel coronary artery disease    S/P primary angioplasty with coronary stent    Palpitations    Mixed hyperlipidemia    Hip pain, bilateral    Hyperkalemia    Anemia    Constipation       Procedure Note    Performed by: Roslyn Kahn DPM    Consent obtained: Yes    Time out taken:  Yes    Pain Control: Anesthetic  Anesthetic: 4% Lidocaine Cream     Debridement:Excisional Debridement    Using curette, scissors and forceps the wound was sharply debrided    down through and including the removal of epidermis, dermis and subcutaneous tissue. Devitalized Tissue Debrided:  fibrin, biofilm, slough, necrotic/eschar, exudate and callus    Pre Debridement Measurements:  Are located in the Wound Documentation Flow Sheet    Wound #: 1 and 2   Wound Care Documentation:  Wound 03/02/21 #2, Right Medial Heel, Diabetic Foot Ulcer, Carbajal 1, onset 11/1/20 (Active)   Wound Image   04/13/21 1043   Wound Etiology Diabetic Carbajal 1 04/27/21 1100   Dressing Status New dressing applied;Clean;Dry; Intact 04/27/21 1100   Wound Cleansed Soap and water 04/27/21 1022   Dressing/Treatment Other (comment) 04/27/21 1100   Offloading for Diabetic Foot Ulcers Yes (type); Offloading boot 04/27/21 1100   Wound Length (cm) 0.7 cm 04/27/21 1022   Wound Width (cm) 0.6 cm 04/27/21 1022   Wound Depth (cm) 0.2 cm 04/27/21 1022   Wound Surface Area (cm^2) 0.42 cm^2 04/27/21 1022   Change in Wound Size % (l*w) 82.5 04/27/21 1022   Wound Volume (cm^3) 0.08 cm^3 04/27/21 1022   Wound Healing % 83 04/27/21 1022   Post-Procedure Length (cm) 0.7 cm 04/27/21 1042   Post-Procedure Width (cm) 0.6 cm 04/27/21 1042   Post-Procedure Depth (cm) 0.2 cm 04/27/21 1042   Post-Procedure Surface Area (cm^2) 0.42 cm^2 04/27/21 1042   Post-Procedure Volume (cm^3) 0.08 cm^3 04/27/21 1042   Distance Tunneling (cm) 0 cm 04/27/21 1022   Undermining Maxium Distance (cm) 0 04/27/21 1022   Wound Assessment Pink/red 04/27/21 1022   Drainage Amount Scant 04/27/21 1022   Drainage Description Serosanguinous 04/27/21 1022   Odor None 04/27/21 1022   Zahida-wound Assessment Hyperkeratosis (callous) 04/27/21 1022   Margins Attached edges 04/20/21 1027   Number of days: 56       Total Surface Area Debrided: 0.42 Square centimeters into the subcutaneous of the right heel    Percentage of wound debrided 100%    Bleeding:  Minimal    Hemostasis Achieved:  by pressure    Procedural Pain:  0  / 10     Post Procedural Pain:  0 / 10     Response to treatment:  Well tolerated by patient. Plan:   Patient examined and evaluated  Wound right without incident  Wound continuing to heal  Daily dressing changes with triad  Reinforced a diabetic diet and taking medications appropriately she is to see her primary care later this week. Keep foot offloaded  This is her last visit and she will be discharged from this wound care center. She needs to follow-up with wound care at home. She is moving back down to [place] next week. We will forward her treatment plan to her physician when she establishes care. She is welcome to call with any questions or concerns. The nature of the patient's condition was explained in depth.  The patient was informed that their compliance to the treatment plan is paramount to successful healing and prevention of further ulceration and/or infection     Discharge Treatment Wound 03/02/21 #2, Right Medial Heel, Diabetic Foot Ulcer, Carbajal 1, onset 11/1/20-Dressing/Treatment: Other (comment)(triad, 4x4, sherice, spandagrip )Daily dressing changes with compression keep legs elevated at all times and not active    Written Patient Discharge Instructions Given            Electronically signed by Octavia Tapia DPM on 4/27/2021 at 11:52 AM